# Patient Record
Sex: MALE | Race: WHITE | NOT HISPANIC OR LATINO | Employment: FULL TIME | URBAN - METROPOLITAN AREA
[De-identification: names, ages, dates, MRNs, and addresses within clinical notes are randomized per-mention and may not be internally consistent; named-entity substitution may affect disease eponyms.]

---

## 2017-01-16 ENCOUNTER — TELEPHONE (OUTPATIENT)
Dept: UROLOGY | Facility: CLINIC | Age: 67
End: 2017-01-16

## 2017-01-16 NOTE — TELEPHONE ENCOUNTER
----- Message from Lucy Clifford sent at 1/16/2017  9:34 AM CST -----  Contact: spouse maximo [phone]  Please call patient spouse maximo [phone], want to know was blood test results was received

## 2017-01-16 NOTE — TELEPHONE ENCOUNTER
I spoke with the pt's wife and advised her I contacted [facility] for the results and they are going to fax them i preparation for tomorrow's appointment.

## 2017-01-17 ENCOUNTER — OFFICE VISIT (OUTPATIENT)
Dept: UROLOGY | Facility: CLINIC | Age: 67
End: 2017-01-17
Payer: COMMERCIAL

## 2017-01-17 VITALS — HEART RATE: 70 BPM | SYSTOLIC BLOOD PRESSURE: 122 MMHG | DIASTOLIC BLOOD PRESSURE: 74 MMHG

## 2017-01-17 DIAGNOSIS — N43.0 ENCYSTED HYDROCELE: ICD-10-CM

## 2017-01-17 DIAGNOSIS — N40.0 BENIGN NON-NODULAR PROSTATIC HYPERPLASIA WITHOUT LOWER URINARY TRACT SYMPTOMS: ICD-10-CM

## 2017-01-17 DIAGNOSIS — R97.20 ELEVATED PROSTATE SPECIFIC ANTIGEN (PSA): Primary | ICD-10-CM

## 2017-01-17 LAB
BILIRUB SERPL-MCNC: NORMAL MG/DL
BLOOD URINE, POC: NORMAL
COLOR, POC UA: YELLOW
GLUCOSE UR QL STRIP: NORMAL
KETONES UR QL STRIP: NORMAL
LEUKOCYTE ESTERASE URINE, POC: NORMAL
NITRITE, POC UA: NORMAL
PH, POC UA: 5
PROTEIN, POC: NORMAL
SPECIFIC GRAVITY, POC UA: 1.01
UROBILINOGEN, POC UA: NORMAL

## 2017-01-17 PROCEDURE — 81002 URINALYSIS NONAUTO W/O SCOPE: CPT | Mod: S$GLB,,, | Performed by: UROLOGY

## 2017-01-17 PROCEDURE — 3074F SYST BP LT 130 MM HG: CPT | Mod: S$GLB,,, | Performed by: UROLOGY

## 2017-01-17 PROCEDURE — 99214 OFFICE O/P EST MOD 30 MIN: CPT | Mod: 25,S$GLB,, | Performed by: UROLOGY

## 2017-01-17 PROCEDURE — 3078F DIAST BP <80 MM HG: CPT | Mod: S$GLB,,, | Performed by: UROLOGY

## 2017-01-17 PROCEDURE — 99999 PR PBB SHADOW E&M-EST. PATIENT-LVL II: CPT | Mod: PBBFAC,,, | Performed by: UROLOGY

## 2017-01-17 PROCEDURE — 1159F MED LIST DOCD IN RCRD: CPT | Mod: S$GLB,,, | Performed by: UROLOGY

## 2017-01-17 NOTE — PROGRESS NOTES
OFFICE NOTE    CHIEF COMPLAINT:  Elevated PSA, hydrocele.    HISTORY OF PRESENT ILLNESS:  This 66-year-old male returns for routine recheck.    He has a history of elevated PSA for which he has undergone a prostate   ultrasound with biopsy on 06/30/2014 by Dr. Astudillo at the [facility], and the   pathology report had revealed no evidence of any malignancy, and he has decided   to continue to follow up with us since he lives on the [place].  His most   recent PSA had come down to 3.9 on 12/27/2016.  Voiding well without any urinary   complaints.  He also has a left hydrocele, which does not bother him, but he is   questioning it since his wife is concerned whether he may need to undergo   surgery.  The patient was reassured of the benign nature of the hydrocele and   unless it bothers him I did not recommend that he needed to undergo any surgery   for the hydrocele, and he states it has not changed in size and does not cause   him any problems.    Medical history update reveals no change in his general health since his last   visit of 07/14/2016.    PHYSICAL EXAMINATION:  ABDOMEN:  Soft, benign and nontender.  No masses.  No hernias, no organomegaly.  EXTERNAL GENITAL:  Normal phallus with adequate meatus.  Scrotal examination   reveals a normal right testis.  On the left side, there is a transilluminating   mass of approximately 8 cm  consistent with a left hydrocele.  RECTAL:  A 25 to 30 g smooth prostate.  No nodules.  Normal sphincter tone.    LABS:  UA negative with pH 5.0.    FINAL IMPRESSION:  History of elevated PSA, which appears to have stabilized;   benign prostatic hyperplasia; left hydrocele, which appears to be stable.    RECOMMENDATIONS:  Recheck in six months with followup PSA.      NERY  dd: 01/17/2017 13:34:58 (CST)  td: 01/17/2017 20:47:33 (CST)  Doc ID   #[id number]  Job ID #[id number]    CC:

## 2017-01-17 NOTE — MR AVS SNAPSHOT
[facility]  [address], [address]  [place] [address]  Phone: [phone]                  Geo Pretty   2017 9:00 AM   Office Visit    Description:  Male : 1950   Provider:  Adelso Cash MD   Department:  [facility]           Reason for Visit     Follow-up           Diagnoses this Visit        Comments    Elevated prostate specific antigen (PSA)    -  Primary            To Do List           Future Appointments        Provider Department Dept Phone    2017 9:00 AM [facility] [phone]      Goals (5 Years of Data)     None      [facility] On Call     [facility] On Call Nurse Care Line -  Assistance  Registered nurses in the [facility] On Call Center provide clinical advisement, health education, appointment booking, and other advisory services.  Call for this free service at [phone].             Medications           Message regarding Medications     Verify the changes and/or additions to your medication regime listed below are the same as discussed with your clinician today.  If any of these changes or additions are incorrect, please notify your healthcare provider.             Verify that the below list of medications is an accurate representation of the medications you are currently taking.  If none reported, the list may be blank. If incorrect, please contact your healthcare provider. Carry this list with you in case of emergency.           Current Medications     atorvastatin (LIPITOR) 10 MG tablet Take 1 tablet (10 mg total) by mouth once daily.    fenofibrate micronized (ANTARA) 90 mg Cap Take 1 capsule by mouth once daily.    irbesartan (AVAPRO) 300 MG tablet Take 1 tablet (300 mg total) by mouth once daily.    ranitidine (ZANTAC) 150 MG tablet Take 1 tablet (150 mg total) by mouth once daily.    sertraline (ZOLOFT) 100 MG tablet Take 1 tablet (100 mg total) by mouth once daily.           Clinical Reference  Information           Vital Signs - Last Recorded  Most recent update: 1/17/2017  9:32 AM by Lisa Bansal MA    BP Pulse                122/74 70          Blood Pressure          Most Recent Value    BP  122/74      Allergies as of 1/17/2017     Ibuprofen    Penicillins      Immunizations Administered on Date of Encounter - 1/17/2017     None      Orders Placed During Today's Visit      Normal Orders This Visit    POCT URINE DIPSTICK WITHOUT MICROSCOPE     Future Labs/Procedures Expected by Expires    PROSTATE SPECIFIC ANTIGEN, DIAGNOSTIC  1/17/2017 3/18/2018

## 2017-07-10 ENCOUNTER — TELEPHONE (OUTPATIENT)
Dept: UROLOGY | Facility: CLINIC | Age: 67
End: 2017-07-10

## 2017-07-10 NOTE — TELEPHONE ENCOUNTER
I spoke with the pt's wife and she stated the pt will have the total and free PSA drawn on Saturday.

## 2017-07-10 NOTE — TELEPHONE ENCOUNTER
----- Message from Payton Black sent at 7/10/2017  1:27 PM CDT -----  Contact: wife Chante   Patient's wife Sharon called requesting his lab order be sent to [facility] in [place]  He has an appointment on 7/19/17      If any questions please call  [phone] to advise.     Thanks

## 2017-07-15 ENCOUNTER — LAB VISIT (OUTPATIENT)
Dept: LAB | Facility: HOSPITAL | Age: 67
End: 2017-07-15
Attending: UROLOGY
Payer: COMMERCIAL

## 2017-07-15 DIAGNOSIS — R97.20 ELEVATED PROSTATE SPECIFIC ANTIGEN (PSA): ICD-10-CM

## 2017-07-15 LAB
PROSTATE SPECIFIC ANTIGEN, TOTAL: 5 NG/ML
PSA FREE MFR SERPL: 20.8 %
PSA FREE SERPL-MCNC: 1.04 NG/ML

## 2017-07-15 PROCEDURE — 36415 COLL VENOUS BLD VENIPUNCTURE: CPT

## 2017-07-15 PROCEDURE — 84153 ASSAY OF PSA TOTAL: CPT

## 2017-07-19 ENCOUNTER — APPOINTMENT (OUTPATIENT)
Dept: LAB | Facility: HOSPITAL | Age: 67
End: 2017-07-19
Attending: UROLOGY
Payer: COMMERCIAL

## 2017-07-19 ENCOUNTER — OFFICE VISIT (OUTPATIENT)
Dept: UROLOGY | Facility: CLINIC | Age: 67
End: 2017-07-19
Payer: COMMERCIAL

## 2017-07-19 VITALS
BODY MASS INDEX: 29.03 KG/M2 | TEMPERATURE: 99 F | WEIGHT: 202.81 LBS | HEART RATE: 77 BPM | RESPIRATION RATE: 18 BRPM | HEIGHT: 70 IN | SYSTOLIC BLOOD PRESSURE: 110 MMHG | DIASTOLIC BLOOD PRESSURE: 69 MMHG

## 2017-07-19 DIAGNOSIS — R31.29 MICROSCOPIC HEMATURIA: Primary | ICD-10-CM

## 2017-07-19 DIAGNOSIS — N40.0 BENIGN NON-NODULAR PROSTATIC HYPERPLASIA WITHOUT LOWER URINARY TRACT SYMPTOMS: ICD-10-CM

## 2017-07-19 DIAGNOSIS — N43.0 ENCYSTED HYDROCELE: ICD-10-CM

## 2017-07-19 DIAGNOSIS — R97.20 ELEVATED PROSTATE SPECIFIC ANTIGEN (PSA): ICD-10-CM

## 2017-07-19 LAB
BILIRUB SERPL-MCNC: ABNORMAL MG/DL
BLOOD URINE, POC: ABNORMAL
COLOR, POC UA: YELLOW
GLUCOSE UR QL STRIP: ABNORMAL
KETONES UR QL STRIP: ABNORMAL
LEUKOCYTE ESTERASE URINE, POC: ABNORMAL
NITRITE, POC UA: ABNORMAL
PH, POC UA: 5
PROTEIN, POC: ABNORMAL
SPECIFIC GRAVITY, POC UA: 1.01
UROBILINOGEN, POC UA: ABNORMAL

## 2017-07-19 PROCEDURE — 88112 CYTOPATH CELL ENHANCE TECH: CPT | Performed by: PATHOLOGY

## 2017-07-19 PROCEDURE — 87086 URINE CULTURE/COLONY COUNT: CPT

## 2017-07-19 PROCEDURE — 99999 PR PBB SHADOW E&M-EST. PATIENT-LVL IV: CPT | Mod: PBBFAC,,, | Performed by: UROLOGY

## 2017-07-19 PROCEDURE — 88112 CYTOPATH CELL ENHANCE TECH: CPT | Mod: 26,,, | Performed by: PATHOLOGY

## 2017-07-19 PROCEDURE — 81002 URINALYSIS NONAUTO W/O SCOPE: CPT | Mod: S$GLB,,, | Performed by: UROLOGY

## 2017-07-19 PROCEDURE — 99214 OFFICE O/P EST MOD 30 MIN: CPT | Mod: 25,S$GLB,, | Performed by: UROLOGY

## 2017-07-19 NOTE — PROGRESS NOTES
OFFICE NOTE    CHIEF COMPLAINT:  Elevated PSA, BPH, and left hydrocele.    HISTORY OF PRESENT ILLNESS:  This 66-year-old male returns for routine recheck.    He has a history of elevated PSA for which he has undergone prostate ultrasound   with biopsy on 06/30/2014 in the [facility] with Dr. Astudillo.  Pathology report   revealed no evidence of any malignancy.  His PSA had subsequently come down to   3.9 on 12/27/2016.  His most recent PSA had risen again to 5.0 on 07/15/2017.    He otherwise is voiding well with no urinary complaints.  He also is being   followed for left hydrocele that appears to be stable and not a major bother to   him, although he did mention that his wife had question whether he may need to   undergo surgery to correct it.  We did discuss repeating a scrotal ultrasound,   to which he agreed.    MEDICAL HISTORY UPDATE:  Reveals no change in his general health since his last   visit of 01/17/2017.    PHYSICAL EXAMINATION:  ABDOMEN:  Soft, benign and nontender.  No masses.  No hernias, no organomegaly.  EXTERNAL GENITAL:  Normal phallus with adequate meatus and again there is a   transilluminating 8 cm mass in the left hemiscrotum consistent with left   hydrocele.  RECTAL:  A 25 to 30 g smooth prostate.  No nodules.  Normal sphincter tone.    UA did reveal 1+ blood, moderate number of rbc's and pH 5.0.    FINAL IMPRESSION:  Elevated PSA, BPH, left hydrocele, and microscopic hematuria,   which had not been noted on prior evaluations.    RECOMMENDATIONS:  Urine for C and S and cytology, scrotal ultrasound for   followup evaluation of the hydrocele and any scrotal findings.  We did discuss   the possibility of obtaining an MRI of the prostate and we also discussed   nutritional health supplements.  After further discussion, we decided that we   will recheck him in four months with a repeat PSA.  In terms of further   hematuria workup,  will depend on the C and S and cytology and if the   hematuria  recurs and this was discussed with him and he stated he understood and   agreed.      MD/KRISHAN  dd: 07/19/2017 13:59:50 (CDT)  td: 07/19/2017 21:09:43 (CDT)  Doc ID   #[id number]  Job ID #[id number]    CC:

## 2017-07-20 LAB — BACTERIA UR CULT: NO GROWTH

## 2017-07-26 DIAGNOSIS — N43.0 ENCYSTED HYDROCELE: Primary | ICD-10-CM

## 2017-07-27 ENCOUNTER — HOSPITAL ENCOUNTER (OUTPATIENT)
Dept: RADIOLOGY | Facility: HOSPITAL | Age: 67
Discharge: HOME | End: 2017-07-27
Attending: UROLOGY
Payer: COMMERCIAL

## 2017-07-27 DIAGNOSIS — N43.0 ENCYSTED HYDROCELE: ICD-10-CM

## 2017-07-27 PROCEDURE — 76870 US EXAM SCROTUM: CPT | Mod: 26,,, | Performed by: RADIOLOGY

## 2017-07-27 PROCEDURE — 76870 US EXAM SCROTUM: CPT | Mod: TC

## 2017-09-20 ENCOUNTER — TELEPHONE (OUTPATIENT)
Dept: UROLOGY | Facility: CLINIC | Age: 67
End: 2017-09-20

## 2017-09-20 DIAGNOSIS — R31.9 HEMATURIA: Primary | ICD-10-CM

## 2017-09-20 NOTE — TELEPHONE ENCOUNTER
----- Message from Dorina Bowden sent at 9/20/2017 12:54 PM CDT -----  Contact:  call  //[phone]    Calling  To  Speak to the  Nurse about    Putting  MRI orders  In  The  Computer // please call

## 2017-09-20 NOTE — TELEPHONE ENCOUNTER
Orders transcribed from results note and signed.  Advised pt wife that MRI not needed at this time unless pt having increased sxs, which pt is not. Did advise that pt is due for UA due to hx of UA at last appt. orders ready and pt wife will have him give sample this weekend.

## 2017-11-22 ENCOUNTER — APPOINTMENT (OUTPATIENT)
Dept: LAB | Facility: HOSPITAL | Age: 67
End: 2017-11-22
Attending: UROLOGY
Payer: MEDICARE

## 2017-11-22 ENCOUNTER — OFFICE VISIT (OUTPATIENT)
Dept: UROLOGY | Facility: CLINIC | Age: 67
End: 2017-11-22
Payer: MEDICARE

## 2017-11-22 VITALS — RESPIRATION RATE: 18 BRPM | TEMPERATURE: 98 F | BODY MASS INDEX: 28.95 KG/M2 | WEIGHT: 202.19 LBS | HEIGHT: 70 IN

## 2017-11-22 DIAGNOSIS — N40.0 BENIGN PROSTATIC HYPERPLASIA WITHOUT LOWER URINARY TRACT SYMPTOMS: ICD-10-CM

## 2017-11-22 DIAGNOSIS — R31.29 MICROSCOPIC HEMATURIA: Primary | ICD-10-CM

## 2017-11-22 DIAGNOSIS — N43.0 ENCYSTED HYDROCELE: ICD-10-CM

## 2017-11-22 DIAGNOSIS — R97.20 ELEVATED PROSTATE SPECIFIC ANTIGEN (PSA): ICD-10-CM

## 2017-11-22 PROCEDURE — 99213 OFFICE O/P EST LOW 20 MIN: CPT | Mod: PBBFAC,PN | Performed by: UROLOGY

## 2017-11-22 PROCEDURE — 81002 URINALYSIS NONAUTO W/O SCOPE: CPT | Mod: PBBFAC,PN | Performed by: UROLOGY

## 2017-11-22 PROCEDURE — 88112 CYTOPATH CELL ENHANCE TECH: CPT | Mod: 26,,, | Performed by: PATHOLOGY

## 2017-11-22 PROCEDURE — 99999 PR PBB SHADOW E&M-EST. PATIENT-LVL III: CPT | Mod: PBBFAC,,, | Performed by: UROLOGY

## 2017-11-22 PROCEDURE — 99214 OFFICE O/P EST MOD 30 MIN: CPT | Mod: 25,S$PBB,, | Performed by: UROLOGY

## 2017-11-22 PROCEDURE — 88112 CYTOPATH CELL ENHANCE TECH: CPT | Performed by: PATHOLOGY

## 2017-11-22 PROCEDURE — 81000 URINALYSIS NONAUTO W/SCOPE: CPT | Mod: PBBFAC,PN | Performed by: UROLOGY

## 2017-11-22 PROCEDURE — 87086 URINE CULTURE/COLONY COUNT: CPT

## 2017-11-22 NOTE — PROGRESS NOTES
CHIEF COMPLAINT:  History of elevated PSA, BPH, left hydrocele, and microscopic   hematuria.    HISTORY OF PRESENT ILLNESS:  This 67-year-old male returns for routine recheck.    He has a history of elevated PSA for which he has undergone a prostate   ultrasound with biopsy in June 2014 at [facility] by Dr. Astudillo, but the   pathology report revealed no malignancy.  Since then the PSAs have stabilized.    His most recent PSA was 3.9 on 11/30/2017 at the same level as it was on 12/27/2016.    The patient also has a left hydrocele that is being followed conservatively, and   the patient did mention that it is not causing him any problems at this point.    He also was noted to have some microscopic hematuria at his last visit on   07/19/2017.  Urine for cultures and cytology were negative.    Urinalysis today again reveals a trace of blood, few rbc's, and pH 5.0.    PHYSICAL EXAMINATION:  As noted at his last visit on 07/19/2017, essentially   unchanged.    FINAL IMPRESSION:  History of elevated PSA that appears to have stabilized, BPH,   left hydrocele, microscopic hematuria.    RECOMMENDATIONS:  Urine for C and S and cytology.  Further hematuria workup was   discussed with the patient including imaging studies and cystoscopy.  We will   contact him with the C and S and cytology report and continue with observation   and conservative management of the left hydrocele and also we will repeat PSA in   four to six months.        NERY  dd: 11/22/2017 11:49:55 (CST)  td: 11/23/2017 06:37:17 (CST)  Doc ID   #[id number]  Job ID #[id number]    CC:

## 2017-11-24 LAB — BACTERIA UR CULT: NORMAL

## 2017-11-27 DIAGNOSIS — N02.9 RECURRENT HEMATURIA: Primary | ICD-10-CM

## 2017-12-01 ENCOUNTER — HOSPITAL ENCOUNTER (OUTPATIENT)
Dept: RADIOLOGY | Facility: HOSPITAL | Age: 67
Discharge: HOME | End: 2017-12-01
Attending: UROLOGY
Payer: MEDICARE

## 2017-12-01 DIAGNOSIS — N02.9 RECURRENT HEMATURIA: ICD-10-CM

## 2017-12-01 PROCEDURE — 74178 CT ABD&PLV WO CNTR FLWD CNTR: CPT | Mod: TC

## 2017-12-01 PROCEDURE — 25500020 PHARM REV CODE 255

## 2017-12-01 PROCEDURE — 74178 CT ABD&PLV WO CNTR FLWD CNTR: CPT | Mod: 26,,, | Performed by: RADIOLOGY

## 2017-12-01 RX ADMIN — IOHEXOL 100 ML: 350 INJECTION, SOLUTION INTRAVENOUS at 08:12

## 2017-12-08 ENCOUNTER — TELEPHONE (OUTPATIENT)
Dept: UROLOGY | Facility: CLINIC | Age: 67
End: 2017-12-08

## 2017-12-08 NOTE — TELEPHONE ENCOUNTER
----- Message from Adelso Cash MD sent at 12/3/2017 10:28 PM CST -----  CT scan - right renal calculi                   Bladder wall thickening                   Enlarged prostate                   Gallstones and fatty liver    Rec: Cysto with retrogrades           Needs to f/u with PCP concerning gallstones and fatty liver

## 2017-12-08 NOTE — TELEPHONE ENCOUNTER
Call placed to give CT results with recommendations, no answer, message left with call back number.

## 2018-04-04 ENCOUNTER — TELEPHONE (OUTPATIENT)
Dept: UROLOGY | Facility: CLINIC | Age: 68
End: 2018-04-04

## 2018-04-04 NOTE — TELEPHONE ENCOUNTER
----- Message from Zayda Archuleta sent at 4/4/2018  9:26 AM CDT -----  Contact: maximo Pretty - spouse  Contact Maria De Jesus Wei to advise when patient should return for a follow up visit at [phone].    Thank you

## 2018-04-04 NOTE — TELEPHONE ENCOUNTER
Returned call and spoke with patient wife, she inquired about the patient's next CT results and next follow up.informed that the office had reached out to him, but no return call from patient. She will speak with him and will call back to schedule a follow up appt, she verbally understood.

## 2018-05-22 ENCOUNTER — OFFICE VISIT (OUTPATIENT)
Dept: UROLOGY | Facility: CLINIC | Age: 68
End: 2018-05-22
Payer: MEDICARE

## 2018-05-22 VITALS
SYSTOLIC BLOOD PRESSURE: 117 MMHG | BODY MASS INDEX: 26.6 KG/M2 | HEART RATE: 90 BPM | HEIGHT: 71 IN | WEIGHT: 190 LBS | DIASTOLIC BLOOD PRESSURE: 70 MMHG

## 2018-05-22 DIAGNOSIS — R31.29 MICROSCOPIC HEMATURIA: ICD-10-CM

## 2018-05-22 DIAGNOSIS — N43.3 HYDROCELE, UNSPECIFIED HYDROCELE TYPE: ICD-10-CM

## 2018-05-22 DIAGNOSIS — R97.20 ELEVATED PROSTATE SPECIFIC ANTIGEN (PSA): Primary | ICD-10-CM

## 2018-05-22 DIAGNOSIS — N40.0 BENIGN PROSTATIC HYPERPLASIA WITHOUT LOWER URINARY TRACT SYMPTOMS: ICD-10-CM

## 2018-05-22 LAB
BILIRUB SERPL-MCNC: NEGATIVE MG/DL
BLOOD URINE, POC: NEGATIVE
COLOR, POC UA: YELLOW
GLUCOSE UR QL STRIP: NEGATIVE
KETONES UR QL STRIP: NEGATIVE
LEUKOCYTE ESTERASE URINE, POC: NEGATIVE
NITRITE, POC UA: NEGATIVE
PH, POC UA: 5
PROTEIN, POC: NEGATIVE
SPECIFIC GRAVITY, POC UA: 1020
UROBILINOGEN, POC UA: NEGATIVE

## 2018-05-22 PROCEDURE — 3008F BODY MASS INDEX DOCD: CPT | Mod: CPTII,S$GLB,, | Performed by: UROLOGY

## 2018-05-22 PROCEDURE — 99214 OFFICE O/P EST MOD 30 MIN: CPT | Mod: 25,S$GLB,, | Performed by: UROLOGY

## 2018-05-22 PROCEDURE — 3074F SYST BP LT 130 MM HG: CPT | Mod: CPTII,S$GLB,, | Performed by: UROLOGY

## 2018-05-22 PROCEDURE — 99999 PR PBB SHADOW E&M-EST. PATIENT-LVL III: CPT | Mod: PBBFAC,,, | Performed by: UROLOGY

## 2018-05-22 PROCEDURE — 3078F DIAST BP <80 MM HG: CPT | Mod: CPTII,S$GLB,, | Performed by: UROLOGY

## 2018-05-22 PROCEDURE — 81002 URINALYSIS NONAUTO W/O SCOPE: CPT | Mod: S$GLB,,, | Performed by: UROLOGY

## 2018-05-22 RX ORDER — SULFAMETHOXAZOLE AND TRIMETHOPRIM 800; 160 MG/1; MG/1
1 TABLET ORAL 2 TIMES DAILY
Qty: 30 TABLET | Refills: 0 | Status: SHIPPED | OUTPATIENT
Start: 2018-05-22 | End: 2018-05-30 | Stop reason: ALTCHOICE

## 2018-05-22 NOTE — PROGRESS NOTES
OFFICE NOTE    CHIEF COMPLAINT:  Elevated PSA, microscopic hematuria, BPH, and left hydrocele.    HISTORY OF PRESENT ILLNESS:  This 67-year-old male returns for routine recheck.    He has a history of elevated PSA for which he has undergone a prostate   ultrasound with biopsy in June 2014 at the [facility] [place] by Dr. Astudillo, which   revealed no evidence of any malignancy.  Since then, the PSAs had stabilized at   3.9 on 11/30/2017 but his most recent PSA had increased to 6.7 on 05/21/2018.  He   also is being followed with a history of microscopic hematuria.  The plan was   for him to undergo cystoscopy and retrograde pyelograms, but this was never done   yet, although we had recommended it to be done.  He had undergone a CT scan,   which revealed presence of right renal calculi and bladder wall thickening, and   enlarged prostate, which also, it was recommended that we would need to have   evaluated with cystoscopy.  He also is being followed for left hydrocele, which   was confirmed on ultrasound, which does not cause him any trouble or problems at   this time.    No other changes in medical history since his last visit of 11/22/2017.    PHYSICAL EXAMINATION:  ABDOMEN:  Soft, benign and nontender.  No masses.  No hernias.  No organomegaly.  EXTERNAL GENITALIA:  Again, reveals a bulging left hemiscrotal mass consistent   with the left hydrocele, essentially unchanged of approximately 8 cm in length.      RECTAL EXAMINATION:  Reveals a 25-30 g smooth prostate.  No nodules.  Normal   sphincter tone.    UA negative with pH 5.0 and more specifically, no evidence of any hematuria on   today's visit.    Last PSA was 6.7 on 05/21/2018.    FINAL IMPRESSION:  Elevated prostate-specific antigen, benign prostatic   hypertrophy, left hydrocele, and history of microscopic hematuria.    RECOMMENDATIONS:  Bactrim DS p.o. b.i.d. for two weeks and the patient will   subsequently repeat the PSA in four to six weeks with followup  appointment and   subsequently discuss the possibility of prostate ultrasound with biopsy,   possibility of cystoscopy may also need to be considered as mentioned above,   that could be done at the same time .      NERY  dd: 05/22/2018 17:48:49 (CDT)  td: 05/23/2018 08:39:00 (CDT)  Doc ID   #[id number]  Job ID #[id number]    CC:

## 2018-07-02 ENCOUNTER — TELEPHONE (OUTPATIENT)
Dept: UROLOGY | Facility: CLINIC | Age: 68
End: 2018-07-02

## 2018-07-02 NOTE — TELEPHONE ENCOUNTER
----- Message from Adelso Cash MD sent at 7/1/2018 10:08 PM CDT -----  PSA - 6.0    Rec: Keep appt

## 2018-07-06 ENCOUNTER — TELEPHONE (OUTPATIENT)
Dept: UROLOGY | Facility: CLINIC | Age: 68
End: 2018-07-06

## 2018-08-02 ENCOUNTER — TELEPHONE (OUTPATIENT)
Dept: UROLOGY | Facility: CLINIC | Age: 68
End: 2018-08-02

## 2018-08-02 NOTE — TELEPHONE ENCOUNTER
----- Message from RT Matthew sent at 8/2/2018  9:31 AM CDT -----  Contact: Chante,wife,[phone]   Chante,wife,[phone], requesting the pt's PSA test results, thanks.

## 2018-08-02 NOTE — TELEPHONE ENCOUNTER
Returned call, informed that I did reach the patient and made follow up appt for him next week, she verbally understood.

## 2018-08-09 ENCOUNTER — OFFICE VISIT (OUTPATIENT)
Dept: UROLOGY | Facility: CLINIC | Age: 68
End: 2018-08-09
Payer: MEDICARE

## 2018-08-09 VITALS
HEIGHT: 71 IN | TEMPERATURE: 99 F | SYSTOLIC BLOOD PRESSURE: 111 MMHG | BODY MASS INDEX: 26.64 KG/M2 | RESPIRATION RATE: 18 BRPM | WEIGHT: 190.25 LBS | HEART RATE: 84 BPM | DIASTOLIC BLOOD PRESSURE: 62 MMHG

## 2018-08-09 DIAGNOSIS — R97.20 ELEVATED PROSTATE SPECIFIC ANTIGEN (PSA): ICD-10-CM

## 2018-08-09 DIAGNOSIS — R31.29 MICROSCOPIC HEMATURIA: Primary | ICD-10-CM

## 2018-08-09 DIAGNOSIS — N20.0 CALCULUS OF KIDNEY: ICD-10-CM

## 2018-08-09 PROCEDURE — 3074F SYST BP LT 130 MM HG: CPT | Mod: CPTII,S$GLB,, | Performed by: UROLOGY

## 2018-08-09 PROCEDURE — 3008F BODY MASS INDEX DOCD: CPT | Mod: CPTII,S$GLB,, | Performed by: UROLOGY

## 2018-08-09 PROCEDURE — 99999 PR PBB SHADOW E&M-EST. PATIENT-LVL III: CPT | Mod: PBBFAC,,, | Performed by: UROLOGY

## 2018-08-09 PROCEDURE — 81002 URINALYSIS NONAUTO W/O SCOPE: CPT | Mod: S$GLB,,, | Performed by: UROLOGY

## 2018-08-09 PROCEDURE — 99213 OFFICE O/P EST LOW 20 MIN: CPT | Mod: 25,S$GLB,, | Performed by: UROLOGY

## 2018-08-09 PROCEDURE — 3078F DIAST BP <80 MM HG: CPT | Mod: CPTII,S$GLB,, | Performed by: UROLOGY

## 2018-08-09 NOTE — PROGRESS NOTES
OFFICE NOTE    CHIEF COMPLAINT:  Microscopic hematuria and elevated PSA.    HISTORY OF PRESENT ILLNESS:  This 67-year-old male returns for routine recheck.    He is in the process of undergoing evaluation for recurrent microscopic   hematuria and a CT scan of the abdomen and pelvis that he had done on 12/01/2017   did reveal presence of right renal calculi and bladder wall thickening and   enlarged prostate.  The plan was for him to undergo cystoscopy and retrogrades,   but the patient never did return until now to be scheduled.  The patient also is   being followed for an elevated PSA that was most recently was 6.0 on 06/29/2018.    Prior to that, it was 6.7 on 05/21/2018, but he had been down to 3.9 in   November 2017.  The patient had undergone transrectal ultrasound biopsy of the   prostate in June 2014 at the [facility] by Dr. Astudillo, but there was no evidence   of any malignancy at that time.  But, in view of the rising PSA again, it was   suggested to the patient that we consider a repeat prostate ultrasound with   biopsy.  It was suggested to him that since he needs to undergo the cystoscopy   and retrogrades we plan to do the transrectal ultrasound biopsy of the   prostate at the same time under the same anesthesia to which he was agreeable.    UA today does again reveal evidence of microscopic hematuria with trace of   blood.    His last PSA was 6.0 on 06/29/2018.    FINAL IMPRESSION:  Recurrent microscopic hematuria and elevated   prostate-specific antigen.  Renal calculi.    RECOMMENDATIONS:  Cystoscopy, retrogrades, and transrectal ultrasound biopsy of   the prostate are tentatively being scheduled under general anesthesia on   08/21/2018.      NERY  dd: 08/09/2018 16:08:17 (CDT)  td: 08/10/2018 09:34:19 (OMER)  Doc ID   #[id number]  Job ID #[id number]    CC:

## 2018-08-14 ENCOUNTER — TELEPHONE (OUTPATIENT)
Dept: UROLOGY | Facility: CLINIC | Age: 68
End: 2018-08-14

## 2018-08-14 NOTE — TELEPHONE ENCOUNTER
----- Message from Daisha Lorenzana sent at 8/14/2018  9:58 AM CDT -----  Type: Needs Medical Advice    Who Called: Patient  Symptoms (please be specific):  Umm  How long has patient had these symptoms:  Umm  Pharmacy name and phone #:  Umm  Best Call Back Number: [phone] (work)    Additional Information: Verifying time and date of procedure he states he has this week

## 2018-08-14 NOTE — TELEPHONE ENCOUNTER
Returned call, informed his procedure date is 8/21/18 and pre-op will contact this week, patient verbally understood.

## 2018-08-15 DIAGNOSIS — R97.20 ELEVATED PSA: ICD-10-CM

## 2018-08-15 DIAGNOSIS — R31.29 MICROSCOPIC HEMATURIA: Primary | ICD-10-CM

## 2018-08-15 DIAGNOSIS — R97.20 ELEVATED PROSTATE SPECIFIC ANTIGEN (PSA): ICD-10-CM

## 2018-08-15 NOTE — H&P (VIEW-ONLY)
Subjective:       Patient ID: Geo Pretty is a 67 y.o. male.    Chief Complaint:  Recurrent microscopic hematuria.  Urine cytology was negative.  CT scan revealed right renal calculi and bladder wall thickening.  PSA elevated at 6.0 that has continued to rise.    Past Medical History:   Diagnosis Date    Depression     Elevated PSA     Fatty liver     GERD (gastroesophageal reflux disease)     Gout     Hearing impaired     Hyperlipidemia     Hypertension     OA (osteoarthritis) of knee     MADELIN (obstructive sleep apnea)      Past Surgical History:   Procedure Laterality Date    HERNIA REPAIR      left inguinal    KNEE SURGERY      left x3    PROSTATE BIOPSY      TONSILLECTOMY       Family History   Problem Relation Age of Onset    Heart disease Father         CAD    Diabetes Father     Kidney disease Father     Stroke Father     Heart disease Mother     Alzheimer's disease Mother     Diabetes Brother     Diabetes Brother      Social History     Socioeconomic History    Marital status:      Spouse name: Not on file    Number of children: 3    Years of education: Not on file    Highest education level: Not on file   Social Needs    Financial resource strain: Not on file    Food insecurity - worry: Not on file    Food insecurity - inability: Not on file    Transportation needs - medical: Not on file    Transportation needs - non-medical: Not on file   Occupational History    Occupation: auto repair     Employer: [employer]   Tobacco Use    Smoking status: Never Smoker    Smokeless tobacco: Never Used   Substance and Sexual Activity    Alcohol use: No     Alcohol/week: 0.0 oz    Drug use: No    Sexual activity: Yes     Partners: Female   Other Topics Concern    Not on file   Social History Narrative    Not on file       Current Outpatient Medications   Medication Sig Dispense Refill    fenofibrate (TRICOR) 145 MG tablet TAKE 1 TABLET(145 MG) BY MOUTH EVERY DAY  90 tablet 0    irbesartan (AVAPRO) 300 MG tablet TAKE 1 TABLET(300 MG) BY MOUTH EVERY DAY 90 tablet 0    pantoprazole (PROTONIX) 40 MG tablet Take 1 tablet (40 mg total) by mouth once daily. 90 tablet 0    sertraline (ZOLOFT) 100 MG tablet Take 1 tablet (100 mg total) by mouth once daily. 90 tablet 0    simvastatin (ZOCOR) 20 MG tablet TAKE 1 TABLET(20 MG) BY MOUTH EVERY EVENING 90 tablet 0     No current facility-administered medications for this visit.      Review of patient's allergies indicates:   Allergen Reactions    Ibuprofen      Other reaction(s): Swelling    Penicillins      Other reaction(s): Anaphylaxis       Review of Systems   All other systems reviewed and are negative.      Objective:      There were no vitals filed for this visit.  Physical Exam   Cardiovascular: Normal rate.   Pulmonary/Chest: Effort normal.   Abdominal: Soft.   Genitourinary: Prostate normal and penis normal.            Assessment:       1. Microscopic hematuria    2. Elevated prostate specific antigen (PSA)    3. Elevated PSA        Plan:       Cystoscopy with retrogrades and transrectal ultrasound biopsy of the prostate

## 2018-08-16 ENCOUNTER — TELEPHONE (OUTPATIENT)
Dept: UROLOGY | Facility: CLINIC | Age: 68
End: 2018-08-16

## 2018-08-16 RX ORDER — SULFAMETHOXAZOLE AND TRIMETHOPRIM 800; 160 MG/1; MG/1
1 TABLET ORAL 2 TIMES DAILY
Qty: 20 TABLET | Refills: 0 | Status: SHIPPED | OUTPATIENT
Start: 2018-08-16 | End: 2018-08-17

## 2018-08-16 RX ORDER — CIPROFLOXACIN 500 MG/1
500 TABLET ORAL 2 TIMES DAILY
Qty: 14 TABLET | Refills: 0 | Status: SHIPPED | OUTPATIENT
Start: 2018-08-16 | End: 2018-08-17

## 2018-08-16 NOTE — TELEPHONE ENCOUNTER
I spoke with the pt and advised him that the antibiotics were sent to his pharmacy and he is going to start them on Monday evening in preparation for the procedure on 08/21/18. He verbalized understanding.

## 2018-08-17 ENCOUNTER — HOSPITAL ENCOUNTER (OUTPATIENT)
Dept: PREADMISSION TESTING | Facility: HOSPITAL | Age: 68
Discharge: HOME | End: 2018-08-17
Attending: UROLOGY
Payer: MEDICARE

## 2018-08-17 VITALS — WEIGHT: 185 LBS | HEIGHT: 70 IN | BODY MASS INDEX: 26.48 KG/M2

## 2018-08-17 DIAGNOSIS — R31.29 MICROSCOPIC HEMATURIA: ICD-10-CM

## 2018-08-17 DIAGNOSIS — R97.20 ELEVATED PROSTATE SPECIFIC ANTIGEN (PSA): ICD-10-CM

## 2018-08-17 LAB
ALBUMIN SERPL BCP-MCNC: 4.5 G/DL
ALP SERPL-CCNC: 40 U/L
ALT SERPL W/O P-5'-P-CCNC: 23 U/L
ANION GAP SERPL CALC-SCNC: 10 MMOL/L
APTT BLDCRRT: 26.9 SEC
AST SERPL-CCNC: 18 U/L
BASOPHILS # BLD AUTO: 0 K/UL
BASOPHILS NFR BLD: 0.4 %
BILIRUB SERPL-MCNC: 0.5 MG/DL
BUN SERPL-MCNC: 24 MG/DL
CALCIUM SERPL-MCNC: 9.8 MG/DL
CHLORIDE SERPL-SCNC: 101 MMOL/L
CO2 SERPL-SCNC: 27 MMOL/L
CREAT SERPL-MCNC: 1.2 MG/DL
DIFFERENTIAL METHOD: ABNORMAL
EOSINOPHIL # BLD AUTO: 0.1 K/UL
EOSINOPHIL NFR BLD: 1.8 %
ERYTHROCYTE [DISTWIDTH] IN BLOOD BY AUTOMATED COUNT: 13.4 %
EST. GFR  (AFRICAN AMERICAN): >60 ML/MIN/1.73 M^2
EST. GFR  (NON AFRICAN AMERICAN): >60 ML/MIN/1.73 M^2
GLUCOSE SERPL-MCNC: 95 MG/DL
HCT VFR BLD AUTO: 37.4 %
HGB BLD-MCNC: 12.6 G/DL
INR PPP: 1
LYMPHOCYTES # BLD AUTO: 1.3 K/UL
LYMPHOCYTES NFR BLD: 19.2 %
MCH RBC QN AUTO: 29.8 PG
MCHC RBC AUTO-ENTMCNC: 33.6 G/DL
MCV RBC AUTO: 89 FL
MONOCYTES # BLD AUTO: 0.8 K/UL
MONOCYTES NFR BLD: 11.3 %
NEUTROPHILS # BLD AUTO: 4.7 K/UL
NEUTROPHILS NFR BLD: 67.3 %
PLATELET # BLD AUTO: 279 K/UL
PMV BLD AUTO: 8.8 FL
POTASSIUM SERPL-SCNC: 4.5 MMOL/L
PROT SERPL-MCNC: 7.5 G/DL
PROTHROMBIN TIME: 10.4 SEC
RBC # BLD AUTO: 4.22 M/UL
SODIUM SERPL-SCNC: 138 MMOL/L
WBC # BLD AUTO: 7 K/UL

## 2018-08-17 PROCEDURE — 99900103 DSU ONLY-NO CHARGE-INITIAL HR (STAT)

## 2018-08-17 PROCEDURE — 85730 THROMBOPLASTIN TIME PARTIAL: CPT

## 2018-08-17 PROCEDURE — 80053 COMPREHEN METABOLIC PANEL: CPT

## 2018-08-17 PROCEDURE — 85025 COMPLETE CBC W/AUTO DIFF WBC: CPT

## 2018-08-17 PROCEDURE — 36415 COLL VENOUS BLD VENIPUNCTURE: CPT

## 2018-08-17 PROCEDURE — 85610 PROTHROMBIN TIME: CPT

## 2018-08-20 ENCOUNTER — TELEPHONE (OUTPATIENT)
Dept: UROLOGY | Facility: CLINIC | Age: 68
End: 2018-08-20

## 2018-08-20 ENCOUNTER — ANESTHESIA EVENT (OUTPATIENT)
Dept: SURGERY | Facility: HOSPITAL | Age: 68
End: 2018-08-20
Payer: MEDICARE

## 2018-08-20 NOTE — TELEPHONE ENCOUNTER
----- Message from Zayda Uriostegui sent at 8/20/2018  9:14 AM CDT -----  Type: Needs Medical Advice    Who Called:  Wife (Chante)  Best Call Back Number: [phone]  Additional Information: Patient has been prescribed two medications for pre op/needs instructions/please call back to advise.

## 2018-08-20 NOTE — TELEPHONE ENCOUNTER
I spoke with the pt and advised that he is to start taking the abx tonight and continue until complete. Understanding was verbalized.

## 2018-08-21 ENCOUNTER — HOSPITAL ENCOUNTER (OUTPATIENT)
Facility: HOSPITAL | Age: 68
Discharge: HOME | End: 2018-08-21
Attending: UROLOGY | Admitting: UROLOGY
Payer: MEDICARE

## 2018-08-21 ENCOUNTER — ANESTHESIA (OUTPATIENT)
Dept: SURGERY | Facility: HOSPITAL | Age: 68
End: 2018-08-21
Payer: MEDICARE

## 2018-08-21 DIAGNOSIS — R97.20 ELEVATED PROSTATE SPECIFIC ANTIGEN (PSA): ICD-10-CM

## 2018-08-21 DIAGNOSIS — R31.29 MICROSCOPIC HEMATURIA: ICD-10-CM

## 2018-08-21 DIAGNOSIS — R97.20 ELEVATED PSA: ICD-10-CM

## 2018-08-21 PROCEDURE — 52005 CYSTO W/URTRL CATHJ: CPT | Mod: ,,, | Performed by: UROLOGY

## 2018-08-21 PROCEDURE — 99900103 DSU ONLY-NO CHARGE-INITIAL HR (STAT): Performed by: UROLOGY

## 2018-08-21 PROCEDURE — 76872 US TRANSRECTAL: CPT | Mod: 26,,, | Performed by: UROLOGY

## 2018-08-21 PROCEDURE — D9220A PRA ANESTHESIA: Mod: CRNA,,, | Performed by: NURSE ANESTHETIST, CERTIFIED REGISTERED

## 2018-08-21 PROCEDURE — 37000009 HC ANESTHESIA EA ADD 15 MINS: Performed by: UROLOGY

## 2018-08-21 PROCEDURE — 88305 TISSUE EXAM BY PATHOLOGIST: CPT | Mod: 26,,, | Performed by: PATHOLOGY

## 2018-08-21 PROCEDURE — 25000003 PHARM REV CODE 250: Performed by: ANESTHESIOLOGY

## 2018-08-21 PROCEDURE — 71000039 HC RECOVERY, EACH ADD'L HOUR: Performed by: UROLOGY

## 2018-08-21 PROCEDURE — 36000707: Performed by: UROLOGY

## 2018-08-21 PROCEDURE — 27200651 HC AIRWAY, LMA: Performed by: NURSE ANESTHETIST, CERTIFIED REGISTERED

## 2018-08-21 PROCEDURE — 74420 UROGRAPHY RTRGR +-KUB: CPT | Mod: 26,,, | Performed by: UROLOGY

## 2018-08-21 PROCEDURE — 71000015 HC POSTOP RECOV 1ST HR: Performed by: UROLOGY

## 2018-08-21 PROCEDURE — 37000008 HC ANESTHESIA 1ST 15 MINUTES: Performed by: UROLOGY

## 2018-08-21 PROCEDURE — 88305 TISSUE EXAM BY PATHOLOGIST: CPT | Performed by: PATHOLOGY

## 2018-08-21 PROCEDURE — 99900104 DSU ONLY-NO CHARGE-EA ADD'L HR (STAT): Performed by: UROLOGY

## 2018-08-21 PROCEDURE — 76000 FLUOROSCOPY <1 HR PHYS/QHP: CPT | Mod: 26,59,, | Performed by: UROLOGY

## 2018-08-21 PROCEDURE — C1758 CATHETER, URETERAL: HCPCS | Performed by: UROLOGY

## 2018-08-21 PROCEDURE — 25500020 PHARM REV CODE 255: Performed by: UROLOGY

## 2018-08-21 PROCEDURE — 71000033 HC RECOVERY, INTIAL HOUR: Performed by: UROLOGY

## 2018-08-21 PROCEDURE — 36000706: Performed by: UROLOGY

## 2018-08-21 PROCEDURE — 25000003 PHARM REV CODE 250: Performed by: NURSE ANESTHETIST, CERTIFIED REGISTERED

## 2018-08-21 PROCEDURE — 55700 PR BIOPSY OF PROSTATE,NEEDLE/PUNCH: CPT | Mod: 51,,, | Performed by: UROLOGY

## 2018-08-21 PROCEDURE — D9220A PRA ANESTHESIA: Mod: ANES,,, | Performed by: ANESTHESIOLOGY

## 2018-08-21 PROCEDURE — C1769 GUIDE WIRE: HCPCS | Performed by: UROLOGY

## 2018-08-21 PROCEDURE — 63600175 PHARM REV CODE 636 W HCPCS: Performed by: NURSE ANESTHETIST, CERTIFIED REGISTERED

## 2018-08-21 PROCEDURE — 25000003 PHARM REV CODE 250: Performed by: UROLOGY

## 2018-08-21 PROCEDURE — 63600175 PHARM REV CODE 636 W HCPCS: Performed by: UROLOGY

## 2018-08-21 PROCEDURE — 76942 ECHO GUIDE FOR BIOPSY: CPT | Mod: 26,59,, | Performed by: UROLOGY

## 2018-08-21 RX ORDER — SODIUM CHLORIDE, SODIUM LACTATE, POTASSIUM CHLORIDE, CALCIUM CHLORIDE 600; 310; 30; 20 MG/100ML; MG/100ML; MG/100ML; MG/100ML
INJECTION, SOLUTION INTRAVENOUS CONTINUOUS
Status: DISCONTINUED | OUTPATIENT
Start: 2018-08-21 | End: 2018-08-21 | Stop reason: HOSPADM

## 2018-08-21 RX ORDER — DEXAMETHASONE SODIUM PHOSPHATE 4 MG/ML
INJECTION, SOLUTION INTRA-ARTICULAR; INTRALESIONAL; INTRAMUSCULAR; INTRAVENOUS; SOFT TISSUE
Status: DISCONTINUED | OUTPATIENT
Start: 2018-08-21 | End: 2018-08-21

## 2018-08-21 RX ORDER — PROPOFOL 10 MG/ML
VIAL (ML) INTRAVENOUS
Status: DISCONTINUED | OUTPATIENT
Start: 2018-08-21 | End: 2018-08-21

## 2018-08-21 RX ORDER — LIDOCAINE HCL/PF 100 MG/5ML
SYRINGE (ML) INTRAVENOUS
Status: DISCONTINUED | OUTPATIENT
Start: 2018-08-21 | End: 2018-08-21

## 2018-08-21 RX ORDER — SODIUM CHLORIDE 0.9 % (FLUSH) 0.9 %
3 SYRINGE (ML) INJECTION
Status: DISCONTINUED | OUTPATIENT
Start: 2018-08-21 | End: 2018-08-21 | Stop reason: HOSPADM

## 2018-08-21 RX ORDER — HYDROCODONE BITARTRATE AND ACETAMINOPHEN 5; 325 MG/1; MG/1
1 TABLET ORAL
Qty: 20 TABLET | Refills: 0 | Status: SHIPPED | OUTPATIENT
Start: 2018-08-21 | End: 2018-09-07 | Stop reason: ALTCHOICE

## 2018-08-21 RX ORDER — FENTANYL CITRATE 50 UG/ML
25 INJECTION, SOLUTION INTRAMUSCULAR; INTRAVENOUS EVERY 5 MIN PRN
Status: DISCONTINUED | OUTPATIENT
Start: 2018-08-21 | End: 2018-08-21 | Stop reason: HOSPADM

## 2018-08-21 RX ORDER — FENTANYL CITRATE 50 UG/ML
INJECTION, SOLUTION INTRAMUSCULAR; INTRAVENOUS
Status: DISCONTINUED | OUTPATIENT
Start: 2018-08-21 | End: 2018-08-21

## 2018-08-21 RX ORDER — MIDAZOLAM HYDROCHLORIDE 1 MG/ML
INJECTION, SOLUTION INTRAMUSCULAR; INTRAVENOUS
Status: DISCONTINUED | OUTPATIENT
Start: 2018-08-21 | End: 2018-08-21

## 2018-08-21 RX ORDER — ONDANSETRON 2 MG/ML
4 INJECTION INTRAMUSCULAR; INTRAVENOUS ONCE
Status: DISCONTINUED | OUTPATIENT
Start: 2018-08-21 | End: 2018-08-21 | Stop reason: HOSPADM

## 2018-08-21 RX ORDER — HYDROCODONE BITARTRATE AND ACETAMINOPHEN 5; 325 MG/1; MG/1
1 TABLET ORAL EVERY 4 HOURS PRN
Status: DISCONTINUED | OUTPATIENT
Start: 2018-08-21 | End: 2018-08-21 | Stop reason: HOSPADM

## 2018-08-21 RX ORDER — MEPERIDINE HYDROCHLORIDE 50 MG/ML
12.5 INJECTION INTRAMUSCULAR; INTRAVENOUS; SUBCUTANEOUS ONCE AS NEEDED
Status: DISCONTINUED | OUTPATIENT
Start: 2018-08-21 | End: 2018-08-21 | Stop reason: HOSPADM

## 2018-08-21 RX ORDER — ONDANSETRON 2 MG/ML
INJECTION INTRAMUSCULAR; INTRAVENOUS
Status: DISCONTINUED | OUTPATIENT
Start: 2018-08-21 | End: 2018-08-21

## 2018-08-21 RX ORDER — OXYCODONE HYDROCHLORIDE 5 MG/1
5 TABLET ORAL
Status: DISCONTINUED | OUTPATIENT
Start: 2018-08-21 | End: 2018-08-21 | Stop reason: HOSPADM

## 2018-08-21 RX ORDER — HYDROMORPHONE HYDROCHLORIDE 1 MG/ML
0.2 INJECTION, SOLUTION INTRAMUSCULAR; INTRAVENOUS; SUBCUTANEOUS EVERY 5 MIN PRN
Status: DISCONTINUED | OUTPATIENT
Start: 2018-08-21 | End: 2018-08-21 | Stop reason: HOSPADM

## 2018-08-21 RX ORDER — LIDOCAINE HYDROCHLORIDE 20 MG/ML
JELLY TOPICAL
Status: DISCONTINUED | OUTPATIENT
Start: 2018-08-21 | End: 2018-08-21 | Stop reason: HOSPADM

## 2018-08-21 RX ORDER — DIPHENHYDRAMINE HYDROCHLORIDE 50 MG/ML
25 INJECTION INTRAMUSCULAR; INTRAVENOUS EVERY 6 HOURS PRN
Status: DISCONTINUED | OUTPATIENT
Start: 2018-08-21 | End: 2018-08-21 | Stop reason: HOSPADM

## 2018-08-21 RX ORDER — EPHEDRINE SULFATE 50 MG/ML
INJECTION, SOLUTION INTRAVENOUS
Status: DISCONTINUED | OUTPATIENT
Start: 2018-08-21 | End: 2018-08-21

## 2018-08-21 RX ORDER — GENTAMICIN SULFATE 80 MG/100ML
80 INJECTION, SOLUTION INTRAVENOUS
Status: COMPLETED | OUTPATIENT
Start: 2018-08-21 | End: 2018-08-21

## 2018-08-21 RX ORDER — SODIUM CHLORIDE, SODIUM LACTATE, POTASSIUM CHLORIDE, CALCIUM CHLORIDE 600; 310; 30; 20 MG/100ML; MG/100ML; MG/100ML; MG/100ML
75 INJECTION, SOLUTION INTRAVENOUS CONTINUOUS
Status: DISCONTINUED | OUTPATIENT
Start: 2018-08-21 | End: 2018-08-21 | Stop reason: HOSPADM

## 2018-08-21 RX ORDER — LIDOCAINE HYDROCHLORIDE 10 MG/ML
1 INJECTION, SOLUTION EPIDURAL; INFILTRATION; INTRACAUDAL; PERINEURAL ONCE
Status: DISCONTINUED | OUTPATIENT
Start: 2018-08-21 | End: 2018-08-21 | Stop reason: HOSPADM

## 2018-08-21 RX ORDER — PHENAZOPYRIDINE HYDROCHLORIDE 200 MG/1
200 TABLET, FILM COATED ORAL ONCE
Status: COMPLETED | OUTPATIENT
Start: 2018-08-21 | End: 2018-08-21

## 2018-08-21 RX ORDER — PHENAZOPYRIDINE HYDROCHLORIDE 100 MG/1
200 TABLET, FILM COATED ORAL
Qty: 20 TABLET | Refills: 0 | Status: SHIPPED | OUTPATIENT
Start: 2018-08-21 | End: 2018-09-07 | Stop reason: ALTCHOICE

## 2018-08-21 RX ADMIN — EPHEDRINE SULFATE 10 MG: 50 INJECTION, SOLUTION INTRAMUSCULAR; INTRAVENOUS; SUBCUTANEOUS at 01:08

## 2018-08-21 RX ADMIN — FENTANYL CITRATE 50 MCG: 50 INJECTION, SOLUTION INTRAMUSCULAR; INTRAVENOUS at 01:08

## 2018-08-21 RX ADMIN — SODIUM CHLORIDE, SODIUM LACTATE, POTASSIUM CHLORIDE, AND CALCIUM CHLORIDE: .6; .31; .03; .02 INJECTION, SOLUTION INTRAVENOUS at 01:08

## 2018-08-21 RX ADMIN — DEXAMETHASONE SODIUM PHOSPHATE 4 MG: 4 INJECTION, SOLUTION INTRAMUSCULAR; INTRAVENOUS at 01:08

## 2018-08-21 RX ADMIN — EPHEDRINE SULFATE 15 MG: 50 INJECTION, SOLUTION INTRAMUSCULAR; INTRAVENOUS; SUBCUTANEOUS at 01:08

## 2018-08-21 RX ADMIN — GENTAMICIN SULFATE 80 MG: 80 INJECTION, SOLUTION INTRAVENOUS at 01:08

## 2018-08-21 RX ADMIN — ONDANSETRON 4 MG: 2 INJECTION, SOLUTION INTRAMUSCULAR; INTRAVENOUS at 01:08

## 2018-08-21 RX ADMIN — PROPOFOL 200 MG: 10 INJECTION, EMULSION INTRAVENOUS at 01:08

## 2018-08-21 RX ADMIN — PHENAZOPYRIDINE HYDROCHLORIDE 200 MG: 200 TABLET ORAL at 02:08

## 2018-08-21 RX ADMIN — MIDAZOLAM 2 MG: 1 INJECTION INTRAMUSCULAR; INTRAVENOUS at 12:08

## 2018-08-21 RX ADMIN — SODIUM CHLORIDE, SODIUM LACTATE, POTASSIUM CHLORIDE, AND CALCIUM CHLORIDE: .6; .31; .03; .02 INJECTION, SOLUTION INTRAVENOUS at 11:08

## 2018-08-21 RX ADMIN — LIDOCAINE HYDROCHLORIDE 100 MG: 20 INJECTION, SOLUTION INTRAVENOUS at 01:08

## 2018-08-21 NOTE — TRANSFER OF CARE
"Anesthesia Transfer of Care Note    Patient: Geo Pretty    Procedure(s) Performed: Procedure(s) (LRB):  BIOPSY, PROSTATE, TRANSRECTAL APPROACH, WITH US GUIDANCE (N/A)  CYSTOSCOPY, WITH RETROGRADE PYELOGRAM (Bilateral)    Patient location: PACU    Transport from OR: Transported from OR on 2-3 L/min O2 by NC with adequate spontaneous ventilation    Post pain: adequate analgesia    Post assessment: no apparent anesthetic complications    Post vital signs: stable    Level of consciousness: awake    Nausea/Vomiting: no nausea/vomiting    Complications: none    Transfer of care protocol was followed      Last vitals:   Visit Vitals  BP (!) 104/54   Pulse 64   Temp 36.6 °C (97.9 °F) (Skin)   Resp 20   Ht 5' 10" (1.778 m)   Wt 83.9 kg (185 lb)   SpO2 99%   BMI 26.54 kg/m²     "

## 2018-08-21 NOTE — OP NOTE
TRANSRECTAL PROSTATIC ULTRASOUND, TRANSRECTAL PROSTATE BIOPSY     DATE: 2018    SURGEON: Adelso Cash MD    ANESTHESIA: General    NAME:Geo Pretty  : 1950  Diagnosis: Elevated PSA, BPH,PROSTATE NODULE    Current PSA: 6.0    VEE: Smooth prostatic surface     PROCEDURE DESCRIPTION:  After informed consent was obtained and signed and after cystoscopy with retrogrades were completed position, with the patient in the lithotomy position the US probe was placed in the rectum.  The prostate was examined and measured the findings are as indicated.     Axial and saggital views were obtained.                        TRANSRECTAL ULTRASOUND  Measurements:   Height:  37.8 mm  Width:  55.9 mm  Length:  55.4 mm  Volume: 61.3 cm3    Seminal vesicles/Ejaculatory Ducts: Normal  Outline/Symmetry of Prostate: WNL  Central Gland/Transition Zone: BPH Cysts  Peripheral Zone: WNL Hypoechoic mid  Demarcation btw. Centra and Peripheral Zones: well demarcated  Bladder: Normal  MedianLobe: Normal    BIOPSY:  Biopsies were obtained from both lobes and total of 6 on each side including base, mid gland, apex, anterior, medial and lateral aspects.  Pressure was held for several minutes on each lobe for hemostasis.  Prophylactic antibiotics were given in the form of Gentamycin 80 mg IV.    IMPRESSION:  Enlarged/Large BPH Suspicion for adenocarcinoma of the prostate    RECOMMENDATION:  1.  Instructed to return to clinic or ED should S/S including fever, chills or the inability to urinate.  2.  Return to clinic in 7-10 days for results and follow up.  3. Continue Cipro and Bactrim til completed.

## 2018-08-21 NOTE — PLAN OF CARE
1645- Meets criteria for discharge. Discharged to home with wife. Denies nausea. Tolerating fluids. Denies pain. Steady gait. Voiding without difficulty. Discharge instructions reviewed and printed handout given. Verbalized understanding.

## 2018-08-21 NOTE — OP NOTE
DATE OF PROCEDURE:  08/21/2018.    PREOPERATIVE DIAGNOSES:  Recurrent microscopic hematuria and elevated PSA of   6.0.    POSTOPERATIVE DIAGNOSES:  Recurrent microscopic hematuria and elevated PSA of   6.0, pathology pending and also submucosal prostatic varices.    PROCEDURE PERFORMED:  Cystourethroscopy, bilateral retrograde pyelogram and   transrectal ultrasound with prostatic biopsies with ultrasonic guidance.    SURGEON:  Adelso Cash M.D.    ANESTHESIA:  General.    PROCEDURE IN DETAIL:  The patient was brought to Cystoscopy Suite and after   adequate induction of general anesthesia, he was placed in lithotomy position.    Genitalia were prepped and draped in usual manner.  Plain fluoroscopic images of   the abdomen and pelvis were obtained and no significant findings were   encountered and in fact the calculi that had been seen on prior imaging studies   were not clearly visible on today's images.  A 22-Yoruba cystoscope sheath with   a foroblique lens and video camera was inserted under direct vision into   urethra.  Examination of anterior urethra was unremarkable.  The instrument was   advanced towards the prostatic urethra where the verumontanum was encountered   and there were number of prostatic calculi embedded within the mucosa of the   lumen of the prostatic urethra.  There was also some enlargement of lateral   lobes of the prostate with increased bulging from the left side to the right   side.  The interior of the bladder was then examined.  The trigone was   symmetrically configurated.  Both orifices were slit-like and had clear efflux.    Bladder mucosa was smooth throughout.  No evidence of trabeculation or   diverticula, no cellules, no lesions, no hemorrhagic sites.  An 8-Yoruba cone   tip ureteral catheter was inserted into the right ureteral orifice and contrast   was injected.  In this fashion, a right retrograde pyelogram was obtained and   this revealed essentially normal right  ureter and pyelocalyceal system.  A   similar procedure was carried out on the left side, which also revealed   essentially normal ureter and pyelocalyceal system.  Subsequently, the bladder   was then drained, cystoscope was removed and subsequently transrectal ultrasound   biopsy of the prostate was performed, and this will be reported on a separate   documentation note to be noted.  After the transrectal ultrasound biopsy of the   prostate had been completed, the patient was then transferred to the Recovery   Room in stable condition.          /nat [id number] review        MD/ARINA  dd: 08/21/2018 14:07:06 (CDT)  td: 08/21/2018 16:55:49 (CDT)  Doc ID   #[id number]  Job ID #[id number]    CC:

## 2018-08-21 NOTE — INTERVAL H&P NOTE
The patient has been examined and the H&P has been reviewed:    I concur with the findings and no changes have occurred since H&P was written.    Anesthesia/Surgery risks, benefits and alternative options discussed and understood by patient/family.          Active Hospital Problems    Diagnosis  POA    Elevated PSA [R97.20]  Yes      Resolved Hospital Problems   No resolved problems to display.

## 2018-08-21 NOTE — ANESTHESIA POSTPROCEDURE EVALUATION
"Anesthesia Post Evaluation    Patient: Geo Pretty    Procedure(s) Performed: Procedure(s) (LRB):  BIOPSY, PROSTATE, TRANSRECTAL APPROACH, WITH US GUIDANCE (N/A)  CYSTOSCOPY, WITH RETROGRADE PYELOGRAM (Bilateral)    Final Anesthesia Type: general  Patient location during evaluation: PACU  Patient participation: Yes- Able to Participate  Level of consciousness: awake and alert and oriented  Post-procedure vital signs: reviewed and stable  Pain management: adequate  Airway patency: patent  PONV status at discharge: No PONV  Anesthetic complications: no      Cardiovascular status: blood pressure returned to baseline  Respiratory status: unassisted, spontaneous ventilation and room air  Hydration status: euvolemic  Follow-up not needed.        Visit Vitals  BP (!) 104/54   Pulse 64   Temp 36.6 °C (97.9 °F) (Skin)   Resp 20   Ht 5' 10" (1.778 m)   Wt 83.9 kg (185 lb)   SpO2 99%   BMI 26.54 kg/m²       Pain/Mali Score: Pain Assessment Performed: Yes (8/21/2018 10:52 AM)  Presence of Pain: denies (8/21/2018 10:52 AM)        "

## 2018-08-21 NOTE — ANESTHESIA PREPROCEDURE EVALUATION
08/21/2018  Geo Pretty is a 67 y.o., male.    Anesthesia Evaluation    I have reviewed the Patient Summary Reports.    I have reviewed the Nursing Notes.   I have reviewed the Medications.     Review of Systems  Social:  Non-Smoker Smoking Status: Never Smoker  Smokeless Tobacco Status: Never Used  Alcohol use: No  Drug use: No       Cardiovascular:   Hypertension hyperlipidemia    Pulmonary:   Sleep Apnea    Hepatic/GI:   GERD Liver Disease,    Musculoskeletal:   Arthritis     Psych:   Psychiatric History          Physical Exam  General:  Well nourished    Airway/Jaw/Neck:  Airway Findings: Mouth Opening: Normal Tongue: Normal  General Airway Assessment: Adult, Good  Mallampati: II  Improves to II with phonation.  TM Distance: 4-6 cm      Dental:  Dental Findings: In tact   Chest/Lungs:  Chest/Lungs Findings: Clear to auscultation, Normal Respiratory Rate     Heart/Vascular:  Heart Findings: Rate: Normal  Rhythm: Regular Rhythm  Sounds: Normal  Heart murmur: negative       Mental Status:  Mental Status Findings:  Cooperative, Alert and Oriented         Anesthesia Plan  Type of Anesthesia, risks & benefits discussed:  Anesthesia Type:  general  Patient's Preference:   Intra-op Monitoring Plan: standard ASA monitors  Intra-op Monitoring Plan Comments:   Post Op Pain Control Plan:   Post Op Pain Control Plan Comments:   Induction:   IV  Beta Blocker:  Patient is not currently on a Beta-Blocker (No further documentation required).       Informed Consent: Patient understands risks and agrees with Anesthesia plan.  Questions answered.   ASA Score: 2     Day of Surgery Review of History & Physical: I have interviewed and examined the patient. I have reviewed the patient's H&P dated:  There are no significant changes.          Ready For Surgery From Anesthesia Perspective.

## 2018-08-21 NOTE — DISCHARGE INSTRUCTIONS
Cystoscopy    Cystoscopy is a procedure that lets your doctor look directly inside your urethra and bladder. It can be used to:  · Help diagnose a problem with your urethra, bladder, or kidneys.  · Take a sample (biopsy) of bladder or urethral tissue.  · Treat certain problems (such as removing kidney stones).  · Place a stent to bypass an obstruction.  · Take special X-rays of the kidneys.  Based on the findings, your doctor may recommend other tests or treatments.  What is a cystoscope?  A cystoscope is a telescope-like instrument that contains lenses and fiberoptics (small glass wires that make bright light). The cystoscope may be straight and rigid, or flexible to bend around curves in the urethra. The doctor may look directly into the cystoscope, or project the image onto a monitor.  Getting ready  · Ask your doctor if you should stop taking any medicines before the procedure.  · Ask whether you should avoid eating or drinking anything after midnight before the procedure.  · Follow any other instructions your doctor gives you.  Tell your doctor before the exam if you:  · Take any medicines, such as aspirin or blood thinners  · Have allergies to any medicines  · Are pregnant   The procedure  Cystoscopy is done in the doctors office, surgery center, or hospital. The doctor and a nurse are present during the procedure. It takes only a few minutes, longer if a biopsy, X-ray, or treatment needs to be done.  During the procedure:  · You lie on an exam table on your back, knees bent and legs apart. You are covered with a drape.  · Your urethra and the area around it are washed. Anesthetic jelly may be applied to numb the urethra. Other pain medicine is usually not needed. In some cases, you may be offered a mild sedative to help you relax. If a more extensive procedure is to be done, such as a biopsy or kidney stone removal, general anesthesia may be needed.  · The cystoscope is inserted. A sterile fluid is put  into the bladder to expand it. You may feel pressure from this fluid.  · When the procedure is done, the cystoscope is removed.  After the procedure  If you had a sedative, general anesthesia, or spinal anesthesia, you must have someone drive you home. Once youre home:  · Drink plenty of fluids.  · You may have burning or light bleeding when you urinate--this is normal.  · Medicines may be prescribed to ease any discomfort or prevent infection. Take these as directed.  · Call your doctor if you have heavy bleeding or blood clots, burning that lasts more than a day, a fever over 100°F  (38° C), or trouble urinating.  Date Last Reviewed: 1/1/2017 © 2000-2017 [employer] [address] [place] [address], [place] [address]. All rights reserved. This information is not intended as a substitute for professional medical care. Always follow your healthcare professional's instructions.      Cystography (Retrograde Cystography)  Cystography (also called retrograde cystography) is a detailed X-ray exam of your bladder. For this procedure, your bladder is filled with an X-ray dye (contrast medium). The dye lets your bladder be seen more clearly on the X-ray images. This procedure is done by a radiologist.    Why cystography is done  A cystography can help diagnose such bladder problems as:  · Kidney stones  · Wounds or bursting (rupture) of your bladder wall  · Urinary tract infection  · Blood clots  · Tumors  Getting ready for your procedure  · If instructed, empty your bowel before the exam using a medicine (laxative) or a liquid injected into your rectum (enema).  · Empty your bladder before the exam.  Tell your provider if you:  · Have any allergies to X-ray dye  · Have a bladder infection  · Are pregnant or think you may be pregnant  Follow any other instructions you are given.  During your procedure  · You will change into a hospital gown and lie on an exam table. Your urethra will be numbed with an anesthetic  jelly. You may also be given medication to help you relax.  · A thin tube (catheter) will be put into your urethra up to your bladder. You will feel pressure. The dye will be slowly put through the catheter into your bladder. As your bladder fills with this liquid, you will feel the need to urinate. Tell the radiologist when this becomes uncomfortable.  · X-rays are taken of your full bladder. The catheter is removed, your bladder is then drained, and more X-rays are taken.  Possible risks and complications  · Infection or bruising at the place where the tube is put into your urethra  · Problems due to the dye. This includes an allergic reaction or kidney damage.  · Radiation exposure to your reproductive organs   After your procedure  · You may feel some burning when you urinate the first few times after the test. Drink plenty of water after the exam to help flush the dye out of your body.   · Your provider will discuss your test results with you. He or she will recommend more testing or treatment as needed.  When to call your healthcare provider   Call your provider right away if you have:  · Blood in your urine, after you have gone to the bathroom three times   · Signs of infection, such as chills, fever, rapid heartbeat, or fast breathing         Date Last Reviewed: 7/23/2015 © 2000-2017 [employer] [address] [place] [address], [place] [address]. All rights reserved. This information is not intended as a substitute for professional medical care. Always follow your healthcare professional's instructions.      Transrectal Ultrasound and Biopsy    A transrectal ultrasound is an imaging test. It uses sound waves to create pictures of a mans prostate gland. Your prostate gland is in front of your rectum. For this test, a special probe (transducer) is placed directly into your rectum. During the test, tissue samples (a biopsy) may also be taken. The test is done by a specially trained technologist called a  sonographer.  Getting ready for your test  · You may be asked to clear your bowel before the test. This may be done by injecting liquid into your rectum (an enema). Or it can be done by drinking a special liquid.  · You may be asked not to eat or drink anything after midnight the night before the test.  · Tell your health care provider about any medicines, herbs, or supplements you are taking. This includes any over-the-counter medicines such as aspirin or ibuprofen.  · Answer any questions your provider has about your medical history. This will help your provider tailor the test to your health needs.  During your test  · You may be asked to change into a gown. You will then lie on your side on an exam table, with your knees bent.  · The test is done with a hand-held probe. This is a short, slender nate. It has a sterile, disposable cover. It is also greased (lubricated) with some gel. It is then gently placed inside your rectum.  · You will feel pressure from the probe. If you feel pain, let your provider know.  · If a biopsy is taken, it is done using a small probe with a very tiny needle on the end. This needle enters your prostate and removes several tiny samples of tissue. These samples are then sent to a lab to be examined. Any mild pain from the biopsy is usually minor.   After your test  Before leaving, you may need to wait for a short time while the images are reviewed. In most cases, you can go back to your normal routine after the test. If you had a biopsy, you may see some blood in your urine, sperm, or stool for a day or so. This is normal. Your health care provider will let you know when your test results are ready.  In some cases, a diagnosis cant be made from the tissue sample that was taken. If this happens, your provider will talk with you about whether you may need another biopsy. Or you may need a different procedure.  When to call your health care provider  Call your provider if you  have:  · Very bloody urine or stool  · A fever lasting 24 to 48 hours  · Any other symptoms that your provider asks you to report, based on your medical condition   Date Last Reviewed: 6/19/2015 © 2000-2017 [employer]. [address] [place] [address], [place] [address]. All rights reserved. This information is not intended as a substitute for professional medical care. Always follow your healthcare professional's instructions.                General Information:    1.  Do not drink alcoholic beverages including beer for 24 hours or as long as you are on pain medication..  2.  Do not drive a motor vehicle, operate machinery or power tools, or signs legal papers for 24 hours or as long as you are on pain medication.   3.  You may experience light-headedness, dizziness, and sleepiness following surgery. Please do not stay alone. A responsible adult should be with you for this 24 hour period.  4.  Go home and rest.    5. Progress slowly to a normal diet unless instructed.  Otherwise, begin with liquids such as soft drinks, then soup and crackers working up to solid foods. Drink plenty of nonalcoholic fluids.  6.  Certain anesthetics and pain medications produce nausea and vomiting in certain       individuals. If nausea becomes a problem at home, call you doctor.    7. A nurse will be calling you sometime after surgery. Do not be alarmed. This is our way of finding out how you are doing.    8. Several times every hour while you are awake, take 2-3 deep breaths and cough. If you had stomach surgery hold a pillow or rolled towel firmly against your stomach before you cough. This will help with any pain the cough might cause.  9. Several times every hour while you are awake, pump and flex your feet 5-6 times and do foot circles. This will help prevent blood clots.    10.Call your doctor for severe pain, bleeding, fever, or signs or symptoms of infection (pain, swelling, redness, foul odor, drainage).    Post op  instructions for prevention of DVT  What is deep vein thrombosis?  Deep vein thrombosis (DVT) is the medical term for blood clots in the deep veins of the leg.  These blood clots can be dangerous.  A DVT can block a blood vessel and keep blood from getting where it needs to go.  Another problem is that the clot can travel to other parts of the body such as the lungs.  A clot that travels to the lungs is called a pulmonary embolus (PE) and can cause serious problems with breathing which can lead to death.  Am I at risk for DVT/PE?  If you are not very active, you are at risk of DVT.  Anyone confined to bed, sitting for long periods of time, recovering from surgery, etc. increases the risk of DVT.  Other risk factors are cancer diagnosis, certain medications, estrogen replacement in any form,older age, obesity, pregnancy, smoking, history of clotting disorders, and dehydration.  How will I know if I have a DVT?   Swelling in the lower leg   Pain   Warmth, redness, hardness or bulging of the vein  If you have any of these symptoms, call your doctors office right away.  Some people will not have any symptoms until the clot moves to the lungs.  What are the symptoms of a PE?   Panting, shortness of breath, or trouble breathing   Sharp, knife-like chest pain when you breathe   Coughing or coughing up blood   Rapid heartbeat  If you have any of these symptoms or get worse quickly, call 911 for emergency treatment.  How can I prevent a DVT?   Avoid long periods of inactivity and dont cross your legs--get up and walk around every hour or so.   Stay active--walking after surgery is highly encouraged.  This means you should get out of the house and walk in the neighborhood.  Going up and down stairs will not impair healing (unless advised against such activity by your doctor).     Drink plenty of noncaffeinated, nonalcoholic fluids each day to prevent dehydration.   Wear special support stockings, if they have  "been advised by your doctor.   If you travel, stop at least once an hour and walk around.   Avoid smoking (assistance with stopping is available through your healthcare provider)  Always notify your doctor if you are not able to follow the post operative instructions that are given to you at the time of discharge.  It may be necessary to prescribe one of the medications available to prevent DVT.    Discharge Instructions: After Your Surgery/Procedure  Youve just had surgery. During surgery you were given medicine called anesthesia to keep you relaxed and free of pain. After surgery you may have some pain or nausea. This is common. Here are some tips for feeling better and getting well after surgery.     Stay on schedule with your medication.   Going home  Your doctor or nurse will show you how to take care of yourself when you go home. He or she will also answer your questions. Have an adult family member or friend drive you home.      For your safety we recommend these precaution for the first 24 hours after your procedure:  · Do not drive or use heavy equipment.  · Do not make important decisions or sign legal papers.  · Do not drink alcohol.  · Have someone stay with you, if needed. He or she can watch for problems and help keep you safe.  · Your concentration, balance, coordination, and judgement may be impaired for many hours after anesthesia.  Use caution when ambulating or standing up.     · You may feel weak and "washed out" after anesthesia and surgery.      Subtle residual effects of general anesthesia or sedation with regional / local anesthesia can last more than 24 hours.  Rest for the remainder of the day or longer if your Doctor/Surgeon has advised you to do so.  Although you may feel normal within the first 24 hours, your reflexes and mental ability may be impaired without you realizing it.  You may feel dizzy, lightheaded or sleepy for 24 hours or longer.      Be sure to go to all follow-up " visits with your doctor. And rest after your surgery for as long as your doctor tells you to.  Coping with pain  If you have pain after surgery, pain medicine will help you feel better. Take it as told, before pain becomes severe. Also, ask your doctor or pharmacist about other ways to control pain. This might be with heat, ice, or relaxation. And follow any other instructions your surgeon or nurse gives you.  Tips for taking pain medicine  To get the best relief possible, remember these points:  · Pain medicines can upset your stomach. Taking them with a little food may help.  · Most pain relievers taken by mouth need at least 20 to 30 minutes to start to work.  · Taking medicine on a schedule can help you remember to take it. Try to time your medicine so that you can take it before starting an activity. This might be before you get dressed, go for a walk, or sit down for dinner.  · Constipation is a common side effect of pain medicines. Call your doctor before taking any medicines such as laxatives or stool softeners to help ease constipation. Also ask if you should skip any foods. Drinking lots of fluids and eating foods such as fruits and vegetables that are high in fiber can also help. Remember, do not take laxatives unless your surgeon has prescribed them.  · Drinking alcohol and taking pain medicine can cause dizziness and slow your breathing. It can even be deadly. Do not drink alcohol while taking pain medicine.  · Pain medicine can make you react more slowly to things. Do not drive or run machinery while taking pain medicine.  Your health care provider may tell you to take acetaminophen to help ease your pain. Ask him or her how much you are supposed to take each day. Acetaminophen or other pain relievers may interact with your prescription medicines or other over-the-counter (OTC) drugs. Some prescription medicines have acetaminophen and other ingredients. Using both prescription and OTC acetaminophen for  pain can cause you to overdose. Read the labels on your OTC medicines with care. This will help you to clearly know the list of ingredients, how much to take, and any warnings. It may also help you not take too much acetaminophen. If you have questions or do not understand the information, ask your pharmacist or health care provider to explain it to you before you take the OTC medicine.  Managing nausea  Some people have an upset stomach after surgery. This is often because of anesthesia, pain, or pain medicine, or the stress of surgery. These tips will help you handle nausea and eat healthy foods as you get better. If you were on a special food plan before surgery, ask your doctor if you should follow it while you get better. These tips may help:  · Do not push yourself to eat. Your body will tell you when to eat and how much.  · Start off with clear liquids and soup. They are easier to digest.  · Next try semi-solid foods, such as mashed potatoes, applesauce, and gelatin, as you feel ready.  · Slowly move to solid foods. Dont eat fatty, rich, or spicy foods at first.  · Do not force yourself to have 3 large meals a day. Instead eat smaller amounts more often.  · Take pain medicines with a small amount of solid food, such as crackers or toast, to avoid nausea.     Call your surgeon if  · You still have pain an hour after taking medicine. The medicine may not be strong enough.  · You feel too sleepy, dizzy, or groggy. The medicine may be too strong.  · You have side effects like nausea, vomiting, or skin changes, such as rash, itching, or hives.       If you have obstructive sleep apnea  You were given anesthesia medicine during surgery to keep you comfortable and free of pain. After surgery, you may have more apnea spells because of this medicine and other medicines you were given. The spells may last longer than usual.   At home:  · Keep using the continuous positive airway pressure (CPAP) device when you sleep.  Unless your health care provider tells you not to, use it when you sleep, day or night. CPAP is a common device used to treat obstructive sleep apnea.  · Talk with your provider before taking any pain medicine, muscle relaxants, or sedatives. Your provider will tell you about the possible dangers of taking these medicines.  © 8428-9802 [employer]. [address] [place] [address], [place] [address]. All rights reserved. This information is not intended as a substitute for professional medical care. Always follow your healthcare professional's instructions.    Using Opioids for Pain Management     Your doctor has given instructions for you to take an opioid.  This is a drug for bad pain.  It helps control pain without causing bleeding and kidney problems.  Common opioid names are morphine, hydromorphone, oxycodone, and methadone. These drugs are called narcotics.    There are several safety concerns you need to know.     · It is against the law to give or sell this drug to another person.  You must keep this medicine safely locked.    · You may have side effects from taking this medication.  These include nausea, itching, sweating, sleepiness, a change in your ability to breathe, and depression.  · Do not take alcohol or sleeping pills opioids.    · Long-term opoid use may no longer giver you relief from pain.  It can cause you stomach pain, mental anxiety, and headaches.  Long-term opoid use can potentially lead to unlawful street drug abuse and reduce your ability to stay employed.    · Your body may become opioid tolerant if you need to take more to get relief.    · You must stop taking opioids if you begin having more pain as a result of the medicine.    · Opioid withdrawal occurs when you have to stop taking the drug.  It can cause you to have nausea, vomiting, diarrhea, stomach pain, anxiety, and dilated pupils in your eyes. This condition means you are opioid dependent.    · Addiction is a drug induced  brain disease. It means there are changes in how your brain is working.  Children, teens, and young adults under 25 years old are more likely to get addicted to opioids.      · Addiction can happen with repeated opioid use.  It does not happen with short-term use of two weeks or less.       For more information, please speak with your doctor or pharmacist.

## 2018-08-21 NOTE — BRIEF OP NOTE
Operative Note     SUMMARY     Surgery Date: 8/21/2018     Surgeon(s) and Role:     * Adelso Cash MD - Primary    Pre-op Diagnosis:  Elevated prostate specific antigen (PSA) [R97.20]  Microscopic hematuria [R31.29]    Post-op Diagnosis:  Elevated prostate specific antigen (PSA) [R97.20]  Microscopic hematuria [R31.29]    Procedure(s) (LRB):  BIOPSY, PROSTATE, TRANSRECTAL APPROACH, WITH US GUIDANCE (N/A)  CYSTOSCOPY, WITH RETROGRADE PYELOGRAM (Bilateral)    Anesthesia: General    Findings/Key Components:  See Op Note      Estimated Blood Loss: * No values recorded between 8/21/2018  1:25 PM and 8/21/2018  2:09 PM *         Specimens (From admission, onward)    Start     Ordered    08/21/18 1346  Specimen to Pathology - Surgery  Once     Start Status   08/21/18 1346 Collected (08/21/18 1355)       08/21/18 1355            Discharge Note      SUMMARY     Admit Date: 8/21/2018    Attending Physician: Adelso Cash MD     Discharge Physician: Adelso Cash MD    Discharge Date: 8/21/2018     Final Diagnosis: Elevated prostate specific antigen (PSA) [R97.20]  Microscopic hematuria [R31.29]    Hospital Course: uneventful, going home same day    Disposition: Home or Self Care    Patient Instructions:   Current Discharge Medication List      START taking these medications    Details   HYDROcodone-acetaminophen (NORCO) 5-325 mg per tablet Take 1 tablet by mouth every 4 to 6 hours as needed for Pain.  Qty: 20 tablet, Refills: 0      phenazopyridine (PYRIDIUM) 100 MG tablet Take 2 tablets (200 mg total) by mouth 3 (three) times daily with meals.  Qty: 20 tablet, Refills: 0         CONTINUE these medications which have NOT CHANGED    Details   fenofibrate (TRICOR) 145 MG tablet TAKE 1 TABLET(145 MG) BY MOUTH EVERY DAY  Qty: 90 tablet, Refills: 0    Associated Diagnoses: Hypercholesteremia      irbesartan (AVAPRO) 300 MG tablet TAKE 1 TABLET(300 MG) BY MOUTH EVERY DAY  Qty: 90 tablet, Refills: 0    Associated  Diagnoses: Essential hypertension      pantoprazole (PROTONIX) 40 MG tablet Take 1 tablet (40 mg total) by mouth once daily.  Qty: 90 tablet, Refills: 0    Associated Diagnoses: Gastroesophageal reflux disease without esophagitis      sertraline (ZOLOFT) 100 MG tablet Take 1 tablet (100 mg total) by mouth once daily.  Qty: 90 tablet, Refills: 0    Comments: Need f/u  Associated Diagnoses: Mild single current episode of major depressive disorder      simvastatin (ZOCOR) 20 MG tablet TAKE 1 TABLET(20 MG) BY MOUTH EVERY EVENING  Qty: 90 tablet, Refills: 0    Associated Diagnoses: Hypercholesteremia             Discharge Procedure Orders (must include Diet, Follow-up, Activity)  Resume previous diet.  Follow up with PCP as needed.

## 2018-08-22 VITALS
BODY MASS INDEX: 26.48 KG/M2 | RESPIRATION RATE: 16 BRPM | HEIGHT: 70 IN | SYSTOLIC BLOOD PRESSURE: 127 MMHG | DIASTOLIC BLOOD PRESSURE: 68 MMHG | OXYGEN SATURATION: 96 % | WEIGHT: 185 LBS | HEART RATE: 80 BPM | TEMPERATURE: 98 F

## 2018-08-22 PROBLEM — F32.5 MAJOR DEPRESSIVE DISORDER IN FULL REMISSION: Chronic | Status: ACTIVE | Noted: 2018-08-22

## 2018-09-07 ENCOUNTER — OFFICE VISIT (OUTPATIENT)
Dept: UROLOGY | Facility: CLINIC | Age: 68
End: 2018-09-07
Payer: MEDICARE

## 2018-09-07 VITALS
BODY MASS INDEX: 27.01 KG/M2 | HEART RATE: 64 BPM | DIASTOLIC BLOOD PRESSURE: 63 MMHG | HEIGHT: 70 IN | TEMPERATURE: 98 F | SYSTOLIC BLOOD PRESSURE: 109 MMHG | RESPIRATION RATE: 18 BRPM | WEIGHT: 188.69 LBS

## 2018-09-07 DIAGNOSIS — R31.29 MICROSCOPIC HEMATURIA: ICD-10-CM

## 2018-09-07 DIAGNOSIS — C61 PROSTATE CANCER: Primary | ICD-10-CM

## 2018-09-07 PROCEDURE — 3078F DIAST BP <80 MM HG: CPT | Mod: CPTII,,, | Performed by: UROLOGY

## 2018-09-07 PROCEDURE — 3074F SYST BP LT 130 MM HG: CPT | Mod: CPTII,,, | Performed by: UROLOGY

## 2018-09-07 PROCEDURE — 1101F PT FALLS ASSESS-DOCD LE1/YR: CPT | Mod: CPTII,,, | Performed by: UROLOGY

## 2018-09-07 PROCEDURE — 99213 OFFICE O/P EST LOW 20 MIN: CPT | Mod: PBBFAC,PN | Performed by: UROLOGY

## 2018-09-07 PROCEDURE — 3008F BODY MASS INDEX DOCD: CPT | Mod: CPTII,,, | Performed by: UROLOGY

## 2018-09-07 PROCEDURE — 99214 OFFICE O/P EST MOD 30 MIN: CPT | Mod: S$PBB,,, | Performed by: UROLOGY

## 2018-09-07 PROCEDURE — 99999 PR PBB SHADOW E&M-EST. PATIENT-LVL III: CPT | Mod: PBBFAC,,, | Performed by: UROLOGY

## 2018-09-07 NOTE — PROGRESS NOTES
OFFICE NOTE    CHIEF COMPLAINT:  Adenocarcinoma of the prostate, microscopic hematuria.    HISTORY OF PRESENT ILLNESS:  This 67-year-old male returns for routine recheck.    He had undergo cystoscopy and bilateral retrograde pyelograms and transrectal   ultrasound biopsy of the prostate on 08/21/2018 for evaluation of recurrent   microscopic hematuria and an elevated PSA of 6.0.  Cystoscopic examinations   revealed some submucosal prostatic varices, otherwise, no other significant   findings, but the pathology report of the prostate biopsy revealed a Rew 6   adenocarcinoma of the prostate in one out of the 12 specimens and the positive   side was on the left lateral lobe of the prostate and there was also a   high-grade prostatic intraepithelial lesion, PIN, in the right medial area, a   biopsy site.  Prostatic volume was 61.3 cc. He recently has also seen his primary care physician Dr. Seo   and found to have elevated BUN and creatinine, and a renal ultrasound and BMP   ordered for him.    Further evaluation and management of the adenocarcinoma of the prostate was   discussed with the patient.  Treatment options including radical prostatectomy,   radiation therapy, brachytherapy, hormonal therapy, combination of above and   also possibility of watchful waiting and surveillance.  I did mention we will   need to complete the metastatic workup to include a CT scan and a bone scan and   subsequently he will make a followup appointment to discuss the treatment plan   and he was given brochure and information concerning these and also the   potential side effects, risks, and expectations, his questions were answered and   he stated he understood.    FINAL IMPRESSION:  Adenocarcinoma of the prostate, microscopic hematuria,   elevated BUN and creatinine.    RECOMMENDATIONS:  We will obtain CT scan of abdomen and pelvis without contrast.    Obtain a bone scan.  We will await the findings of the renal ultrasound and    BMP that were ordered also by Dr. Seo.  The patient will subsequently make a   followup appointment to discuss the treatment plan.      NERY  dd: 09/07/2018 14:38:40 (CDT)  td: 09/08/2018 07:22:19 (CDT)  Doc ID   #[id number]  Job ID #[id number]    CC:

## 2018-09-11 ENCOUNTER — HOSPITAL ENCOUNTER (OUTPATIENT)
Dept: RADIOLOGY | Facility: HOSPITAL | Age: 68
Discharge: HOME | End: 2018-09-11
Attending: UROLOGY
Payer: MEDICARE

## 2018-09-11 DIAGNOSIS — C61 PROSTATE CANCER: ICD-10-CM

## 2018-09-11 PROCEDURE — 78306 BONE IMAGING WHOLE BODY: CPT | Mod: 26,,, | Performed by: RADIOLOGY

## 2018-09-11 PROCEDURE — A9503 TC99M MEDRONATE: HCPCS

## 2018-09-11 PROCEDURE — 74176 CT ABD & PELVIS W/O CONTRAST: CPT | Mod: 26,,, | Performed by: RADIOLOGY

## 2018-09-11 PROCEDURE — 74176 CT ABD & PELVIS W/O CONTRAST: CPT | Mod: TC

## 2018-09-11 PROCEDURE — 25500020 PHARM REV CODE 255

## 2018-09-11 RX ADMIN — IOHEXOL 30 ML: 350 INJECTION, SOLUTION INTRAVENOUS at 09:09

## 2018-09-27 PROBLEM — C61 PROSTATIC CANCER: Status: ACTIVE | Noted: 2018-09-27

## 2018-09-28 ENCOUNTER — TELEPHONE (OUTPATIENT)
Dept: UROLOGY | Facility: CLINIC | Age: 68
End: 2018-09-28

## 2018-09-28 NOTE — TELEPHONE ENCOUNTER
The pt's wife states the pt is going to proceed with brachytherapy as his treatment for PCA. I advised her the physician that does this procedure would be Dr Lerma and he is located in [facility] She was advised the pt needs to keep his appointment on 10/09/18 so that the referral can be given. Understanding was verbalized.

## 2018-09-28 NOTE — TELEPHONE ENCOUNTER
----- Message from Zhanna Ramos sent at 9/28/2018  9:39 AM CDT -----  Contact: Wife, Chante Sharif want to no what oncologist doctor will be referring patient to please call back at [phone]

## 2018-10-09 ENCOUNTER — OFFICE VISIT (OUTPATIENT)
Dept: UROLOGY | Facility: CLINIC | Age: 68
End: 2018-10-09
Payer: MEDICARE

## 2018-10-09 ENCOUNTER — TELEPHONE (OUTPATIENT)
Dept: UROLOGY | Facility: CLINIC | Age: 68
End: 2018-10-09

## 2018-10-09 VITALS
RESPIRATION RATE: 18 BRPM | BODY MASS INDEX: 27.36 KG/M2 | DIASTOLIC BLOOD PRESSURE: 67 MMHG | WEIGHT: 191.13 LBS | SYSTOLIC BLOOD PRESSURE: 120 MMHG | HEIGHT: 70 IN | TEMPERATURE: 98 F | HEART RATE: 88 BPM

## 2018-10-09 DIAGNOSIS — C61 PROSTATE CANCER: Primary | ICD-10-CM

## 2018-10-09 DIAGNOSIS — N20.0 RENAL CALCULUS: ICD-10-CM

## 2018-10-09 DIAGNOSIS — R31.29 MICROSCOPIC HEMATURIA: ICD-10-CM

## 2018-10-09 PROCEDURE — 1101F PT FALLS ASSESS-DOCD LE1/YR: CPT | Mod: CPTII,,, | Performed by: UROLOGY

## 2018-10-09 PROCEDURE — 99999 PR PBB SHADOW E&M-EST. PATIENT-LVL III: CPT | Mod: PBBFAC,,, | Performed by: UROLOGY

## 2018-10-09 PROCEDURE — 99214 OFFICE O/P EST MOD 30 MIN: CPT | Mod: S$PBB,,, | Performed by: UROLOGY

## 2018-10-09 PROCEDURE — 3078F DIAST BP <80 MM HG: CPT | Mod: CPTII,,, | Performed by: UROLOGY

## 2018-10-09 PROCEDURE — 3074F SYST BP LT 130 MM HG: CPT | Mod: CPTII,,, | Performed by: UROLOGY

## 2018-10-09 PROCEDURE — 99213 OFFICE O/P EST LOW 20 MIN: CPT | Mod: PBBFAC,PN | Performed by: UROLOGY

## 2018-10-09 NOTE — TELEPHONE ENCOUNTER
----- Message from Maynor Hall sent at 10/9/2018  4:15 PM CDT -----  Contact: Ying/[facility]  Ying called in and stated Dr. Cash referred the attached patient & patient is scheduled for tomorrow 10/10/18 at 8:30am. Ying needs medical records & demographics faxed to her at [phone] & call back number is [phone]

## 2018-10-09 NOTE — PROGRESS NOTES
OFFICE NOTE    CHIEF COMPLAINT:  Adenocarcinoma of the prostate with microscopic hematuria.    HISTORY OF PRESENT ILLNESS:  This 68-year-old male returns for routine recheck.    He was diagnosed with adenocarcinoma of the prostate following prostate   ultrasound with biopsy on 08/21/2018 when he was found to have an elevated PSA   of 6.0.  The pathology report revealed a Ulm 6 adenocarcinoma in 1 out of   the 12 specimens, although there was also another specimen with a high-grade   intraepithelial lesion.  Prostatic volume was 61.3 mL.  He completed metastatic   workup that included a CT scan and bone scan, both of these were negative for   metastatic disease.  The treatment options were again discussed with the patient   and he states he is leaning towards undergoing brachytherapy for which I   mention we would need to refer him to Dr. Lerma who is the radiation   oncologist who specializes in performing brachytherapy for which the patient   states he has an appointment with him actually in fact the next day.  The   patient also has been evaluated for microscopic hematuria for which underwent   cystoscopy and bilateral retrograde pyelograms at the same time as the prostate   ultrasound with biopsy on  August 21, 2018 and the cystoscopy did reveal some submucosal prostatic   varices, but no other significant findings.  It must be noted on the CT scan,   there was found to be a nonobstructing 3-mm right renal calculus, otherwise no   obvious significant  findings.  The only other finding that were mentioned to   the patient include gallstones, also some fatty liver, and the patient did   mention that his primary care physician, Dr. Seo is familiar with these   findings since he is the one who ordered a CT scan.    FINAL IMPRESSION:  Adenocarcinoma of prostate, microscopic hematuria, renal   calculus.    RECOMMENDATION:  The patient will keep his appointment with Dr. Lerma to be   evaluated for  brachytherapy.  Otherwise, he will continue his routine followup   with us in terms of followup with microscopic hematuria and renal calculus for   which he will need to be reevaluated at least in six months and we will await   Dr. Lerma's decision concerning the brachytherapy.      NERY  dd: 10/09/2018 17:54:55 (CDT)  td: 10/10/2018 08:38:12 (CDT)  Doc ID   #[id number]  Job ID #[id number]    CC:

## 2018-10-10 ENCOUNTER — TELEPHONE (OUTPATIENT)
Dept: UROLOGY | Facility: CLINIC | Age: 68
End: 2018-10-10

## 2018-10-10 NOTE — TELEPHONE ENCOUNTER
----- Message from Julissa Gonzalez sent at 10/10/2018  8:20 AM CDT -----  Contact: Ying  Type: Needs Medical Advice    Who Called:  Ying with [facility] [place]  Symptoms (please be specific):    How long has patient had these symptoms:    Pharmacy name and phone #:    Best Call Back Number: [phone]  Additional Information: did received P,Requesting pathology,lab work, and xrays,fax to [phone].patient is schedule to come in today for a visit need information asap.

## 2018-10-31 PROBLEM — K63.5 COLON POLYPS: Status: ACTIVE | Noted: 2018-10-31

## 2018-12-20 PROBLEM — Z98.890 S/P COLONOSCOPY: Chronic | Status: ACTIVE | Noted: 2018-12-20

## 2019-10-17 ENCOUNTER — TELEPHONE (OUTPATIENT)
Dept: RHEUMATOLOGY | Facility: CLINIC | Age: 69
End: 2019-10-17

## 2019-10-18 ENCOUNTER — OFFICE VISIT (OUTPATIENT)
Dept: RHEUMATOLOGY | Facility: CLINIC | Age: 69
End: 2019-10-18
Payer: MEDICARE

## 2019-10-18 VITALS
DIASTOLIC BLOOD PRESSURE: 58 MMHG | WEIGHT: 207 LBS | HEIGHT: 70 IN | SYSTOLIC BLOOD PRESSURE: 100 MMHG | BODY MASS INDEX: 29.63 KG/M2 | HEART RATE: 67 BPM

## 2019-10-18 DIAGNOSIS — M1A.09X0 IDIOPATHIC CHRONIC GOUT OF MULTIPLE SITES WITHOUT TOPHUS: Primary | ICD-10-CM

## 2019-10-18 PROCEDURE — 1101F PR PT FALLS ASSESS DOC 0-1 FALLS W/OUT INJ PAST YR: ICD-10-PCS | Mod: CPTII,S$GLB,, | Performed by: INTERNAL MEDICINE

## 2019-10-18 PROCEDURE — 99999 PR PBB SHADOW E&M-EST. PATIENT-LVL III: ICD-10-PCS | Mod: PBBFAC,,, | Performed by: INTERNAL MEDICINE

## 2019-10-18 PROCEDURE — 99204 OFFICE O/P NEW MOD 45 MIN: CPT | Mod: S$GLB,,, | Performed by: INTERNAL MEDICINE

## 2019-10-18 PROCEDURE — 3078F DIAST BP <80 MM HG: CPT | Mod: CPTII,S$GLB,, | Performed by: INTERNAL MEDICINE

## 2019-10-18 PROCEDURE — 3078F PR MOST RECENT DIASTOLIC BLOOD PRESSURE < 80 MM HG: ICD-10-PCS | Mod: CPTII,S$GLB,, | Performed by: INTERNAL MEDICINE

## 2019-10-18 PROCEDURE — 99999 PR PBB SHADOW E&M-EST. PATIENT-LVL III: CPT | Mod: PBBFAC,,, | Performed by: INTERNAL MEDICINE

## 2019-10-18 PROCEDURE — 3074F SYST BP LT 130 MM HG: CPT | Mod: CPTII,S$GLB,, | Performed by: INTERNAL MEDICINE

## 2019-10-18 PROCEDURE — 99204 PR OFFICE/OUTPT VISIT, NEW, LEVL IV, 45-59 MIN: ICD-10-PCS | Mod: S$GLB,,, | Performed by: INTERNAL MEDICINE

## 2019-10-18 PROCEDURE — 1101F PT FALLS ASSESS-DOCD LE1/YR: CPT | Mod: CPTII,S$GLB,, | Performed by: INTERNAL MEDICINE

## 2019-10-18 PROCEDURE — 3074F PR MOST RECENT SYSTOLIC BLOOD PRESSURE < 130 MM HG: ICD-10-PCS | Mod: CPTII,S$GLB,, | Performed by: INTERNAL MEDICINE

## 2019-10-18 RX ORDER — ALLOPURINOL 300 MG/1
300 TABLET ORAL DAILY
Qty: 30 TABLET | Refills: 6 | Status: SHIPPED | OUTPATIENT
Start: 2019-10-18 | End: 2019-11-17

## 2019-10-18 RX ORDER — COLCHICINE 0.6 MG/1
1 CAPSULE ORAL DAILY
Qty: 30 CAPSULE | Refills: 6 | Status: SHIPPED | OUTPATIENT
Start: 2019-10-18 | End: 2019-11-17

## 2019-10-18 RX ORDER — METHYLPREDNISOLONE 4 MG/1
TABLET ORAL
Qty: 1 PACKAGE | Refills: 0 | Status: SHIPPED | OUTPATIENT
Start: 2019-10-18 | End: 2020-01-06 | Stop reason: ALTCHOICE

## 2019-10-18 ASSESSMENT — ROUTINE ASSESSMENT OF PATIENT INDEX DATA (RAPID3)
PAIN SCORE: 7.5
MDHAQ FUNCTION SCORE: 0
TOTAL RAPID3 SCORE: 2.83
AM STIFFNESS SCORE: 0, NO
PSYCHOLOGICAL DISTRESS SCORE: 0
FATIGUE SCORE: 0
PATIENT GLOBAL ASSESSMENT SCORE: 1

## 2019-10-18 NOTE — PROGRESS NOTES
Chief Complaint   Patient presents with    Disease Management     chronic gout       Patient was referred by     History of presenting illness    69 year old white male comes in with recurrent right knee effusions many many years ago  This led to 3 surgeries and finally it was diagnosed to be gout in 2000    For years he did well  He was on allopurinol daily dose not known     Over the last few years he had only few attacks  Then when allopurinol was taken off he has had more attacks  But the last two months attacks are severe   Left knee vs right knee vs ankles    Diet  -doesn't eat shell fish,doesnt drink alcohol,doesnt eat red meat  Drinks lemon/lime  Stopped drinking sodas  Sugar and salt not restricted      Last uric acid 9.1 in aug 2019    Attack :  -starts with a twitch,then severe pain,swelling   Lasts for 2 weeks  Interim period no symptoms at all  Colchicine has never been prescribed the right way and hence it doesn't help  Indomethacin helps    Now off and on allopurinol    Past history : depression,HTN,HLD,GERD,Gout,sleep apnea   Prostate cancer s/p surgery    Family history : diabetes,kidney disease,stroke,heart disease     Social history : not a smoker      Review of Systems       No skin rashes,malar rash,photosensitivity   No telangiectasias   No calcinosis   No psoriasis   No patchy alopecia   No oral and nasal ulcers   No dry eyes and dry mouth   No pleurisy or any cardiopulmonary complaints   No dysphagia,diplopia and dysphonia and muscle weakness   No n/v/d/c   No acid reflux+   No raynaud's+   No digital ulcers   No cytopenias   No renal issues   No blood clots   No fever,chills,night sweats,weight loss and loss of appetite   No pregnancy losses/pre term deliveries /pregnancy complications   No new onset headaches   No recurrent conjunctivitis or uveitis or scleritis or episcleritis   No chronic or bloody diarrhea with no u colitis or crohn's /inflammatory bowel disease   No penile and  urethral  d/c/STDs/no ulcers     Labs     Mild anemia  UTD cancer screening  Recent CMPs normal  Uric acid now 9.1  When he took allopurinol uric acid stayed between 5.7 to 6.4    Xrays  Left foot and ankle nml        Physical Exam   Constitutional: He is oriented to person, place, and time and well-developed, well-nourished, and in no distress. No distress.   HENT:   Head: Normocephalic.   Mouth/Throat: Oropharynx is clear and moist.   Eyes: Conjunctivae are normal. Pupils are equal, round, and reactive to light. Right eye exhibits no discharge. Left eye exhibits no discharge. No scleral icterus.   Neck: Normal range of motion. No thyromegaly present.   Cardiovascular: Normal rate, regular rhythm, normal heart sounds and intact distal pulses.    Pulmonary/Chest: Effort normal and breath sounds normal. No stridor.   Abdominal: Soft. Bowel sounds are normal.   Lymphadenopathy:     He has no cervical adenopathy.   Neurological: He is alert and oriented to person, place, and time.   Skin: Skin is warm. No rash noted. He is not diaphoretic.     Psychiatric: Affect and judgment normal.   Musculoskeletal: Normal range of motion.           Assessment     69 year old white male with depression,HTN,HLD,GERD,Gout,sleep apnea   Prostate cancer s/p surgery    Has had gout for years in the knees initially went undiagnosed  In 2000,rheumatology evaluation confirmed the diagnosis  Allopurinol 300 mg helped him for years    When the medication was taken off the attacks recurred,initially less frequency and now extremely frequent in the knees and ankles     Diet  -doesn't eat shell fish,doesnt drink alcohol,doesnt eat red meat  Drinks lemon/lime  Stopped drinking sodas  Sugar and salt not restricted    Last uric acid 9.1 in aug 2019    Colchicine has never been prescribed the right way and hence it doesn't help  Indomethacin helps  Now off and on allopurinol    Today left ankle tender and swollen  No tophi/? A small deposit on the  ankle xray?? Unclear     1. Idiopathic chronic gout of multiple sites without tophus            New problem     Plan    Treatment goals :     Manage the acute attack if it happens   Colchicine vs prednisone vs NSAIDs   Ice application helps     ULT : always remember that allopurinol should never be stopped   Start allopurinol 300 mg daily   If well tolerated stay on the dose,in 4 weeks rpt uric acid,LFTs,renal function and then titrate dose   Will follow CBC,CMP periodically   Risk of hypersensitivity syndrome discussed which is usually within the first 60 days,common in the setting of renal dysfunction and use of thiazides.   Cautious use of antibiotics like amoxicillin and ampicillin advised   If GI upset will split the dose of allopurinol     Colchicine 0.6 mg daily suggested : for acute attack prophylaxis   Side effects : Gi upset,bone marrow suppression,cardiac toxicity,arrythmias,myotoxicity   No statin use   No renal insufficiency   No liver disease   Not on cyclosporine,tacrolimus,clarithromycin,erythromycin,verapamil,diltiazem,ketoconazole   Need to assess CBC,CMP discussed   Will stop colchicine 3 months after the target uric acid of less than 6 for nontophaceous gout and less than 5 for tophaceous gout is achieved,and he has no flares     Dietary modifications discussed : gave a hand out of all foods to avoid : avoid organ meat,red meat,seafood,shell fish,anchovies,sardines,alcohol particularly beer,fructose containing soft drinks   Its ok to consume moderate wine,diet drinks,dairy proteins,coffee   Eat cherries   Eat purine rich vegetables     Avoid beta blockers and diuretics   Losartan preferred anit hypertensive   Fenofibrate better hyperlipidemic drug     Blood pressure monitoring   Weight loss suggested     Labs every 4 weeks until the goal uric acid is achieved and then every 6 months if asymptomatic   rtc in 3 months       Geo was seen today for disease management.    Diagnoses and all orders for  this visit:    Idiopathic chronic gout of multiple sites without tophus  -     CBC auto differential; Standing  -     Comprehensive metabolic panel; Standing  -     Uric acid; Standing    Other orders  -     methylPREDNISolone (MEDROL DOSEPACK) 4 mg tablet; use as directed  -     allopurinol (ZYLOPRIM) 300 MG tablet; Take 1 tablet (300 mg total) by mouth once daily.  -     colchicine (MITIGARE) 0.6 mg Cap; Take 1 capsule (0.6 mg total) by mouth once daily.

## 2019-10-18 NOTE — LETTER
October 18, 2019      Melain eSo MD  [address]  [facility] [address]           [facility]  [address]  [place] [address]  Phone: [phone]  Fax: [phone]          Patient: Geo Pretty   MR Number: [id number]   YOB: 1950   Date of Visit: 10/18/2019       Dear Dr. Melani Seo:    Thank you for referring Geo Pretty to me for evaluation. Attached you will find relevant portions of my assessment and plan of care.    If you have questions, please do not hesitate to call me. I look forward to following Geo Pretty along with you.    Sincerely,    Jared Red MD    Enclosure  CC:  No Recipients    If you would like to receive this communication electronically, please contact [email or web address] or [phone] to request more information on [employer] access.    For providers and/or their staff who would like to refer a patient to [facility], please contact us through our one-stop-shop provider referral line, [facility], at [phone].    If you feel you have received this communication in error or would no longer like to receive these types of communications, please e-mail [email or web address]

## 2019-12-09 DIAGNOSIS — M10.9 GOUT SYNOVITIS: ICD-10-CM

## 2019-12-09 DIAGNOSIS — M25.572 ACUTE LEFT ANKLE PAIN: ICD-10-CM

## 2019-12-09 RX ORDER — COLCHICINE 0.6 MG/1
0.6 TABLET ORAL DAILY
Qty: 90 TABLET | Refills: 2 | Status: SHIPPED | OUTPATIENT
Start: 2019-12-09 | End: 2020-03-12

## 2019-12-09 RX ORDER — ALLOPURINOL 100 MG/1
100 TABLET ORAL DAILY
Qty: 90 TABLET | Refills: 2 | Status: SHIPPED | OUTPATIENT
Start: 2019-12-09 | End: 2019-12-09

## 2019-12-09 RX ORDER — ALLOPURINOL 100 MG/1
100 TABLET ORAL DAILY
Qty: 90 TABLET | Refills: 2 | Status: SHIPPED | OUTPATIENT
Start: 2019-12-09 | End: 2019-12-12

## 2019-12-09 RX ORDER — COLCHICINE 0.6 MG/1
0.6 TABLET ORAL DAILY
Qty: 90 TABLET | Refills: 2 | Status: SHIPPED | OUTPATIENT
Start: 2019-12-09 | End: 2019-12-09

## 2019-12-12 ENCOUNTER — PATIENT MESSAGE (OUTPATIENT)
Dept: RHEUMATOLOGY | Facility: CLINIC | Age: 69
End: 2019-12-12

## 2019-12-12 DIAGNOSIS — M10.9 GOUT SYNOVITIS: ICD-10-CM

## 2019-12-12 RX ORDER — ALLOPURINOL 300 MG/1
300 TABLET ORAL DAILY
Qty: 90 TABLET | Refills: 2 | Status: SHIPPED | OUTPATIENT
Start: 2019-12-12 | End: 2020-03-12

## 2020-01-06 PROBLEM — R97.20 ELEVATED PSA: Status: RESOLVED | Noted: 2018-08-15 | Resolved: 2020-01-06

## 2020-01-29 ENCOUNTER — OFFICE VISIT (OUTPATIENT)
Dept: RHEUMATOLOGY | Facility: CLINIC | Age: 70
End: 2020-01-29
Payer: MEDICARE

## 2020-01-29 ENCOUNTER — LAB VISIT (OUTPATIENT)
Dept: LAB | Facility: HOSPITAL | Age: 70
End: 2020-01-29
Attending: INTERNAL MEDICINE
Payer: MEDICARE

## 2020-01-29 VITALS
BODY MASS INDEX: 29.51 KG/M2 | DIASTOLIC BLOOD PRESSURE: 66 MMHG | SYSTOLIC BLOOD PRESSURE: 112 MMHG | WEIGHT: 205.69 LBS | HEART RATE: 83 BPM

## 2020-01-29 DIAGNOSIS — M1A.09X0 IDIOPATHIC CHRONIC GOUT OF MULTIPLE SITES WITHOUT TOPHUS: Primary | ICD-10-CM

## 2020-01-29 DIAGNOSIS — M1A.09X0 IDIOPATHIC CHRONIC GOUT OF MULTIPLE SITES WITHOUT TOPHUS: ICD-10-CM

## 2020-01-29 DIAGNOSIS — M19.012 PRIMARY OSTEOARTHRITIS OF LEFT SHOULDER: ICD-10-CM

## 2020-01-29 LAB
ALBUMIN SERPL BCP-MCNC: 4.6 G/DL (ref 3.5–5.2)
ALP SERPL-CCNC: 48 U/L (ref 55–135)
ALT SERPL W/O P-5'-P-CCNC: 41 U/L (ref 10–44)
ANION GAP SERPL CALC-SCNC: 10 MMOL/L (ref 8–16)
AST SERPL-CCNC: 25 U/L (ref 10–40)
BASOPHILS # BLD AUTO: 0.04 K/UL (ref 0–0.2)
BASOPHILS NFR BLD: 0.6 % (ref 0–1.9)
BILIRUB SERPL-MCNC: 0.4 MG/DL (ref 0.1–1)
BUN SERPL-MCNC: 18 MG/DL (ref 8–23)
CALCIUM SERPL-MCNC: 9.6 MG/DL (ref 8.7–10.5)
CHLORIDE SERPL-SCNC: 101 MMOL/L (ref 95–110)
CO2 SERPL-SCNC: 28 MMOL/L (ref 23–29)
CREAT SERPL-MCNC: 1.1 MG/DL (ref 0.5–1.4)
DIFFERENTIAL METHOD: ABNORMAL
EOSINOPHIL # BLD AUTO: 0.2 K/UL (ref 0–0.5)
EOSINOPHIL NFR BLD: 2.4 % (ref 0–8)
ERYTHROCYTE [DISTWIDTH] IN BLOOD BY AUTOMATED COUNT: 13.2 % (ref 11.5–14.5)
EST. GFR  (AFRICAN AMERICAN): >60 ML/MIN/1.73 M^2
EST. GFR  (NON AFRICAN AMERICAN): >60 ML/MIN/1.73 M^2
GLUCOSE SERPL-MCNC: 97 MG/DL (ref 70–110)
HCT VFR BLD AUTO: 44.3 % (ref 40–54)
HGB BLD-MCNC: 14.3 G/DL (ref 14–18)
IMM GRANULOCYTES # BLD AUTO: 0.02 K/UL (ref 0–0.04)
IMM GRANULOCYTES NFR BLD AUTO: 0.3 % (ref 0–0.5)
LYMPHOCYTES # BLD AUTO: 1.2 K/UL (ref 1–4.8)
LYMPHOCYTES NFR BLD: 17.3 % (ref 18–48)
MCH RBC QN AUTO: 30.1 PG (ref 27–31)
MCHC RBC AUTO-ENTMCNC: 32.3 G/DL (ref 32–36)
MCV RBC AUTO: 93 FL (ref 82–98)
MONOCYTES # BLD AUTO: 0.8 K/UL (ref 0.3–1)
MONOCYTES NFR BLD: 11.9 % (ref 4–15)
NEUTROPHILS # BLD AUTO: 4.8 K/UL (ref 1.8–7.7)
NEUTROPHILS NFR BLD: 67.5 % (ref 38–73)
NRBC BLD-RTO: 0 /100 WBC
PLATELET # BLD AUTO: 219 K/UL (ref 150–350)
PMV BLD AUTO: 10.8 FL (ref 9.2–12.9)
POTASSIUM SERPL-SCNC: 4.3 MMOL/L (ref 3.5–5.1)
PROT SERPL-MCNC: 7.6 G/DL (ref 6–8.4)
RBC # BLD AUTO: 4.75 M/UL (ref 4.6–6.2)
SODIUM SERPL-SCNC: 139 MMOL/L (ref 136–145)
URATE SERPL-MCNC: 6.3 MG/DL (ref 3.4–7)
WBC # BLD AUTO: 7.06 K/UL (ref 3.9–12.7)

## 2020-01-29 PROCEDURE — 1126F PR PAIN SEVERITY QUANTIFIED, NO PAIN PRESENT: ICD-10-PCS | Mod: S$GLB,,, | Performed by: INTERNAL MEDICINE

## 2020-01-29 PROCEDURE — 1101F PT FALLS ASSESS-DOCD LE1/YR: CPT | Mod: CPTII,S$GLB,, | Performed by: INTERNAL MEDICINE

## 2020-01-29 PROCEDURE — 3074F SYST BP LT 130 MM HG: CPT | Mod: CPTII,S$GLB,, | Performed by: INTERNAL MEDICINE

## 2020-01-29 PROCEDURE — 99214 OFFICE O/P EST MOD 30 MIN: CPT | Mod: S$GLB,,, | Performed by: INTERNAL MEDICINE

## 2020-01-29 PROCEDURE — 99214 PR OFFICE/OUTPT VISIT, EST, LEVL IV, 30-39 MIN: ICD-10-PCS | Mod: S$GLB,,, | Performed by: INTERNAL MEDICINE

## 2020-01-29 PROCEDURE — 80053 COMPREHEN METABOLIC PANEL: CPT

## 2020-01-29 PROCEDURE — 99999 PR PBB SHADOW E&M-EST. PATIENT-LVL III: ICD-10-PCS | Mod: PBBFAC,,, | Performed by: INTERNAL MEDICINE

## 2020-01-29 PROCEDURE — 3074F PR MOST RECENT SYSTOLIC BLOOD PRESSURE < 130 MM HG: ICD-10-PCS | Mod: CPTII,S$GLB,, | Performed by: INTERNAL MEDICINE

## 2020-01-29 PROCEDURE — 3078F PR MOST RECENT DIASTOLIC BLOOD PRESSURE < 80 MM HG: ICD-10-PCS | Mod: CPTII,S$GLB,, | Performed by: INTERNAL MEDICINE

## 2020-01-29 PROCEDURE — 1126F AMNT PAIN NOTED NONE PRSNT: CPT | Mod: S$GLB,,, | Performed by: INTERNAL MEDICINE

## 2020-01-29 PROCEDURE — 84550 ASSAY OF BLOOD/URIC ACID: CPT

## 2020-01-29 PROCEDURE — 36415 COLL VENOUS BLD VENIPUNCTURE: CPT

## 2020-01-29 PROCEDURE — 1159F MED LIST DOCD IN RCRD: CPT | Mod: S$GLB,,, | Performed by: INTERNAL MEDICINE

## 2020-01-29 PROCEDURE — 3078F DIAST BP <80 MM HG: CPT | Mod: CPTII,S$GLB,, | Performed by: INTERNAL MEDICINE

## 2020-01-29 PROCEDURE — 99999 PR PBB SHADOW E&M-EST. PATIENT-LVL III: CPT | Mod: PBBFAC,,, | Performed by: INTERNAL MEDICINE

## 2020-01-29 PROCEDURE — 1101F PR PT FALLS ASSESS DOC 0-1 FALLS W/OUT INJ PAST YR: ICD-10-PCS | Mod: CPTII,S$GLB,, | Performed by: INTERNAL MEDICINE

## 2020-01-29 PROCEDURE — 85025 COMPLETE CBC W/AUTO DIFF WBC: CPT

## 2020-01-29 PROCEDURE — 1159F PR MEDICATION LIST DOCUMENTED IN MEDICAL RECORD: ICD-10-PCS | Mod: S$GLB,,, | Performed by: INTERNAL MEDICINE

## 2020-01-29 RX ORDER — DICLOFENAC SODIUM 10 MG/G
2 GEL TOPICAL 2 TIMES DAILY PRN
Qty: 100 G | Refills: 5 | Status: SHIPPED | OUTPATIENT
Start: 2020-01-29 | End: 2020-09-16

## 2020-01-29 ASSESSMENT — ROUTINE ASSESSMENT OF PATIENT INDEX DATA (RAPID3)
PSYCHOLOGICAL DISTRESS SCORE: 0
TOTAL RAPID3 SCORE: .33
AM STIFFNESS SCORE: 0, NO
MDHAQ FUNCTION SCORE: 0
PAIN SCORE: 0
PATIENT GLOBAL ASSESSMENT SCORE: 1
FATIGUE SCORE: 1

## 2020-01-29 NOTE — PROGRESS NOTES
Chief Complaint   Patient presents with    Follow-up     gout         History of presenting illness    69 year old white male presented with recurrent right knee effusions many many years ago  This led to 3 surgeries and finally it was diagnosed to be gout in 2000    For years he did well    He was on allopurinol daily dose not known     Over the last few years he had only few attacks  Then when allopurinol was taken off he has had more attacks  But the last two months attacks are severe   Left knee vs right knee vs ankles    Diet  -doesn't eat shell fish,doesnt drink alcohol,doesnt eat red meat  Drinks lemon/lime  Stopped drinking sodas  Sugar and salt not restricted     uric acid 9.1 in aug 2019    Attack :  -starts with a twitch,then severe pain,swelling   Lasts for 2 weeks  Interim period no symptoms at all  Colchicine has never been prescribed the right way and hence it doesn't help  Indomethacin helps    I saw him October 2019    Resumed daily allopurinol 300 mg and colchicine 0.6 mg daily    He has done well  No gout attacks  Uric acid dropped to 6    Past history : depression,HTN,HLD,GERD,Gout,sleep apnea   Prostate cancer s/p surgery    Family history : diabetes,kidney disease,stroke,heart disease     Social history : not a smoker      Review of Systems       No skin rashes,malar rash,photosensitivity   No telangiectasias   No calcinosis   No psoriasis   No patchy alopecia   No oral and nasal ulcers   No dry eyes and dry mouth   No pleurisy or any cardiopulmonary complaints   No dysphagia,diplopia and dysphonia and muscle weakness   No n/v/d/c   No acid reflux+   No raynaud's+   No digital ulcers   No cytopenias   No renal issues   No blood clots   No fever,chills,night sweats,weight loss and loss of appetite   No pregnancy losses/pre term deliveries /pregnancy complications   No new onset headaches   No recurrent conjunctivitis or uveitis or scleritis or episcleritis   No chronic or bloody diarrhea with  no u colitis or crohn's /inflammatory bowel disease   No penile and urethral  d/c/STDs/no ulcers     Labs     Mild anemia  UTD cancer screening  Recent CMPs normal  Uric acid off allopurinol  : goes up to 9.1  When he took allopurinol uric acid stayed between 5.7 to 6.4    Xrays  Left foot and ankle nml    There is currently no information documented on the homunculus. Go to the Rheumatology activity and complete the homunculus joint exam.    Physical Exam   Constitutional: He is oriented to person, place, and time and well-developed, well-nourished, and in no distress. No distress.   HENT:   Head: Normocephalic.   Mouth/Throat: Oropharynx is clear and moist.   Eyes: Conjunctivae are normal. Pupils are equal, round, and reactive to light. Right eye exhibits no discharge. Left eye exhibits no discharge. No scleral icterus.   Neck: Normal range of motion. No thyromegaly present.   Cardiovascular: Normal rate, regular rhythm, normal heart sounds and intact distal pulses.    Pulmonary/Chest: Effort normal and breath sounds normal. No stridor.   Abdominal: Soft. Bowel sounds are normal.   Lymphadenopathy:     He has no cervical adenopathy.   Neurological: He is alert and oriented to person, place, and time.   Skin: Skin is warm. No rash noted. He is not diaphoretic.     Psychiatric: Affect and judgment normal.   Musculoskeletal: Normal range of motion.           Assessment     69 year old white male with depression,HTN,HLD,GERD,Gout,sleep apnea   Prostate cancer s/p surgery    Has had gout for years in the knees initially went undiagnosed    In 2000,rheumatology evaluation confirmed the diagnosis  Allopurinol 300 mg helped him for years  When the medication was taken off the attacks recurred,initially less frequency and then extremely frequent in the knees and ankles     Came with uric acid of 9.1    Initial visit : left ankle tender and swollen  No tophi/? A small deposit on the ankle xray?? Unclear     Well controlled  gout with normal uric acid of 6    On allopurinol 300 mg and colchicine 0.6 mg     1. Idiopathic chronic gout of multiple sites without tophus    2. Primary osteoarthritis of left shoulder            F/u problem    Plan    Stop the colchicine    Continue allopurinol 300 mg daily    Dietary modifications discussed : gave a hand out of all foods to avoid : avoid organ meat,red meat,seafood,shell fish,anchovies,sardines,alcohol particularly beer,fructose containing soft drinks   Its ok to consume moderate wine,diet drinks,dairy proteins,coffee   Eat cherries   Eat purine rich vegetables     Avoid beta blockers and diuretics   Losartan preferred anit hypertensive   Fenofibrate better hyperlipidemic drug     Blood pressure monitoring   Weight loss suggested     Labs today and every 6 months       Geo was seen today for follow-up.    Diagnoses and all orders for this visit:    Idiopathic chronic gout of multiple sites without tophus  -     CBC auto differential; Future  -     Comprehensive metabolic panel; Future  -     Uric acid; Future  -     CBC auto differential; Future  -     Comprehensive metabolic panel; Future  -     Uric acid; Future    Primary osteoarthritis of left shoulder  -     diclofenac sodium (VOLTAREN) 1 % Gel; Apply 2 g topically 2 (two) times daily as needed.

## 2020-05-06 ENCOUNTER — OFFICE VISIT (OUTPATIENT)
Dept: OTOLARYNGOLOGY | Facility: CLINIC | Age: 70
End: 2020-05-06
Payer: MEDICARE

## 2020-05-06 ENCOUNTER — CLINICAL SUPPORT (OUTPATIENT)
Dept: AUDIOLOGY | Facility: CLINIC | Age: 70
End: 2020-05-06
Payer: MEDICARE

## 2020-05-06 VITALS — SYSTOLIC BLOOD PRESSURE: 116 MMHG | HEART RATE: 71 BPM | DIASTOLIC BLOOD PRESSURE: 73 MMHG

## 2020-05-06 DIAGNOSIS — H81.12 BPPV (BENIGN PAROXYSMAL POSITIONAL VERTIGO), LEFT: Primary | ICD-10-CM

## 2020-05-06 DIAGNOSIS — H90.3 SENSORINEURAL HEARING LOSS (SNHL) OF BOTH EARS: Primary | ICD-10-CM

## 2020-05-06 DIAGNOSIS — H91.8X3 ASYMMETRICAL HEARING LOSS, UNSPECIFIED LATERALITY: ICD-10-CM

## 2020-05-06 DIAGNOSIS — R09.81 NASAL CONGESTION: ICD-10-CM

## 2020-05-06 DIAGNOSIS — H90.3 ASYMMETRICAL SENSORINEURAL HEARING LOSS: ICD-10-CM

## 2020-05-06 DIAGNOSIS — H93.12 LEFT-SIDED TINNITUS: ICD-10-CM

## 2020-05-06 PROCEDURE — 95992 CANALITH REPOSITIONING PROC: CPT | Mod: S$GLB,,, | Performed by: OTOLARYNGOLOGY

## 2020-05-06 PROCEDURE — 3074F PR MOST RECENT SYSTOLIC BLOOD PRESSURE < 130 MM HG: ICD-10-PCS | Mod: CPTII,S$GLB,, | Performed by: OTOLARYNGOLOGY

## 2020-05-06 PROCEDURE — 1101F PT FALLS ASSESS-DOCD LE1/YR: CPT | Mod: CPTII,S$GLB,, | Performed by: OTOLARYNGOLOGY

## 2020-05-06 PROCEDURE — 1126F AMNT PAIN NOTED NONE PRSNT: CPT | Mod: S$GLB,,, | Performed by: OTOLARYNGOLOGY

## 2020-05-06 PROCEDURE — 92557 PR COMPREHENSIVE HEARING TEST: ICD-10-PCS | Mod: S$GLB,,, | Performed by: AUDIOLOGIST

## 2020-05-06 PROCEDURE — 99204 OFFICE O/P NEW MOD 45 MIN: CPT | Mod: 25,S$GLB,, | Performed by: OTOLARYNGOLOGY

## 2020-05-06 PROCEDURE — 92567 PR TYMPA2METRY: ICD-10-PCS | Mod: S$GLB,,, | Performed by: AUDIOLOGIST

## 2020-05-06 PROCEDURE — 3074F SYST BP LT 130 MM HG: CPT | Mod: CPTII,S$GLB,, | Performed by: OTOLARYNGOLOGY

## 2020-05-06 PROCEDURE — 1101F PR PT FALLS ASSESS DOC 0-1 FALLS W/OUT INJ PAST YR: ICD-10-PCS | Mod: CPTII,S$GLB,, | Performed by: OTOLARYNGOLOGY

## 2020-05-06 PROCEDURE — 3078F DIAST BP <80 MM HG: CPT | Mod: CPTII,S$GLB,, | Performed by: OTOLARYNGOLOGY

## 2020-05-06 PROCEDURE — 95992 PR CANALITH REPOSITIONING PROCEDURE, PER DAY: ICD-10-PCS | Mod: S$GLB,,, | Performed by: OTOLARYNGOLOGY

## 2020-05-06 PROCEDURE — 3078F PR MOST RECENT DIASTOLIC BLOOD PRESSURE < 80 MM HG: ICD-10-PCS | Mod: CPTII,S$GLB,, | Performed by: OTOLARYNGOLOGY

## 2020-05-06 PROCEDURE — 99999 PR PBB SHADOW E&M-EST. PATIENT-LVL I: CPT | Mod: PBBFAC,,,

## 2020-05-06 PROCEDURE — 1159F MED LIST DOCD IN RCRD: CPT | Mod: S$GLB,,, | Performed by: OTOLARYNGOLOGY

## 2020-05-06 PROCEDURE — 99999 PR PBB SHADOW E&M-EST. PATIENT-LVL III: ICD-10-PCS | Mod: PBBFAC,,, | Performed by: OTOLARYNGOLOGY

## 2020-05-06 PROCEDURE — 1126F PR PAIN SEVERITY QUANTIFIED, NO PAIN PRESENT: ICD-10-PCS | Mod: S$GLB,,, | Performed by: OTOLARYNGOLOGY

## 2020-05-06 PROCEDURE — 1159F PR MEDICATION LIST DOCUMENTED IN MEDICAL RECORD: ICD-10-PCS | Mod: S$GLB,,, | Performed by: OTOLARYNGOLOGY

## 2020-05-06 PROCEDURE — 92567 TYMPANOMETRY: CPT | Mod: S$GLB,,, | Performed by: AUDIOLOGIST

## 2020-05-06 PROCEDURE — 92557 COMPREHENSIVE HEARING TEST: CPT | Mod: S$GLB,,, | Performed by: AUDIOLOGIST

## 2020-05-06 PROCEDURE — 99999 PR PBB SHADOW E&M-EST. PATIENT-LVL I: ICD-10-PCS | Mod: PBBFAC,,,

## 2020-05-06 PROCEDURE — 99999 PR PBB SHADOW E&M-EST. PATIENT-LVL III: CPT | Mod: PBBFAC,,, | Performed by: OTOLARYNGOLOGY

## 2020-05-06 PROCEDURE — 99204 PR OFFICE/OUTPT VISIT, NEW, LEVL IV, 45-59 MIN: ICD-10-PCS | Mod: 25,S$GLB,, | Performed by: OTOLARYNGOLOGY

## 2020-05-06 RX ORDER — AMOXICILLIN AND CLAVULANATE POTASSIUM 500; 125 MG/1; MG/1
1 TABLET, FILM COATED ORAL 2 TIMES DAILY
Qty: 10 TABLET | Refills: 0 | Status: CANCELLED | OUTPATIENT
Start: 2020-05-06

## 2020-05-06 NOTE — PATIENT INSTRUCTIONS
By exam, patient has signs of Benign Paroxysmal Positional Vertigo (BPPV). A canalith repositioning procedure (CRP) was performed for the left ear (Epley). Patient knows to try to remain upright for 48 hrs. If symptoms persist or recur he can try the CRP maneuver at home (a handout was given). If his symptoms persist past a couple weeks and are not improving, he should return for re-evaluation.  See info regarding BPPV attached.    We also discussed his long history of hearing loss and the benefit of using a hearing aid for the right ear to maintain his speech discrimination.    He knows to call the clinic at [phone] for any concerns.      Benign Paroxysmal Positional Vertigo     Your health care provider may move your head in certain ways to treat your BPPV.     Benign paroxysmal positional vertigo (BPPV) is a problem with the inner ear. The inner ear contains the vestibular system. This system is what helps you keep your balance. BPPV causes a feeling of spinning. It is a common problem of the vestibular system.  Understanding the vestibular system  The vestibular system of the ear is made up of very tiny parts. They include the utricle, saccule, and semicircular canals. The utricle is a tiny organ that contains calcium crystals. In some people, the crystals can move into the semicircular canals. When this happens, the system no longer works as it should. This causes BPPV. Benign means it is not life-threatening. Paroxysmal means it happens suddenly. Positional means that it happens when you move your head. Vertigo is a feeling of spinning.  What causes BPPV?  Causes include injury to your head or neck. Other problems with the vestibular system may cause BPPV. In many people, the cause of BPPV is not known.  Symptoms of BPPV  You many have repeated feelings of spinning (vertigo). The vertigo usually lasts less than 1 minute. Some movements, suchas rolling over in bed, can bring on vertigo.  Diagnosing  BPPV  Your primary health care provider may diagnose and treat your BPPV. Or you may see an ear, nose, and throat doctor (otolaryngologist). In some cases, you may see a nervous system doctor (neurologist).  The health care provider will ask about your symptoms and your medical history. He or she will examine you. You may have hearing and balance tests. As part of the exam, your health care provider may have you move your head and body in certain ways. If you have BPPV, the movements can bring on vertigo. Your provider will also look for abnormal movements of your eyes. You may have other tests to check your vestibular or nervous systems.  Treatment for BPPV  Your health care provider may try to move the calcium crystals. This is done by having you move your head and neck in certain ways. This treatment is safe and often works well. You may also be told to do these movements at home. You may still have vertigo for a few weeks. Your health care provider will recheck your symptoms, usually in about a month. Special physical therapy may also be part of treatment. In rare cases surgery may be needed for BPPV that does not go away.     When to call the health care provider  Call your health care provider right away if you have any of these:  · Symptoms that do not go away with treatment  · Symptoms that get worse  · New symptoms   Date Last Reviewed: 3/19/2015  © 1319-1372 [employer]. [address] [place] [address], [place] [address]. All rights reserved. This information is not intended as a substitute for professional medical care. Always follow your healthcare professional's instructions.

## 2020-05-06 NOTE — PROGRESS NOTES
Geo Pretty was seen in the clinic today for an audiological evaluation.  Mr. Pretty reported that he has been experiencing dizziness for a couple of weeks.  He also reported bilateral hearing loss, left worse than right.  Mr. Pretty uses a hearing aid for the right ear.    Audiological testing revealed a mild to severe sensorineural hearing loss for the right ear and profound rising to severe sloping to profound sensorineural hearing loss for the left ear.  A speech reception threshold was obtained at 45 dBHL for the right ear.  Speech discrimination was 72% for the right ear.  Speech testing was attempted for the left ear but results could not be obtained due to the severity of hearing loss.      Tympanometry testing was attempted for the right ear but results could not be obtained due to the inability to maintain a hermetic seal and revealed a Type A tympanogram for the left ear.      Recommendations:  1. Otologic evaluation  2. Annual audiological evaluation  3. Hearing protection when in noise   4. Continue use of amplification for the right ear

## 2020-05-06 NOTE — PROGRESS NOTES
70 y/o male with about 3-4 weeks of dizziness, referred by Dr. Seo.  Complains of a spinning sensation when he rotates from side to side in bed. He is not sure which side is worse. He feels spinning that only lasts seconds to minutes as long as he looks upward. He notices slight movement if he bends over or when he sits up.  Has had left tinnitus for long time - no recent increase. No N/V. No recent head trauma or concussions. 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  Some mild nasal congestion for a few weeks. No discolored mucus. No fever. No headache. No cough. No facial pain. No ear pain currently. No prior history of vertigo or dizziness like this.  Took Zithromax. No significant change in symptoms.      PMHx, PSHx, Meds, Allergies, SocHx, FamHx reviewed in EPIC    Review of Systems   Constitutional: Positive for malaise/fatigue. Negative for chills, fever and weight loss.   HENT: Positive for congestion, ear pain, hearing loss and tinnitus. Negative for ear discharge, nosebleeds, sinus pain and sore throat.    Eyes: Negative for blurred vision, double vision and discharge.   Respiratory: Negative for cough, shortness of breath and wheezing.    Cardiovascular: Negative for chest pain, palpitations and leg swelling.   Gastrointestinal: Negative for abdominal pain, constipation, diarrhea, heartburn and vomiting.   Genitourinary: Negative for frequency and urgency.   Musculoskeletal: Negative for back pain, falls, joint pain, myalgias and neck pain.   Skin: Negative for rash.   Neurological: Positive for dizziness. Negative for seizures and headaches.   Endo/Heme/Allergies: Does not bruise/bleed easily.   Psychiatric/Behavioral: Negative for depression. The patient is not nervous/anxious.    Answers for HPI/ROS submitted by the patient on 5/5/2020   sinus pressure : Yes  Snoring?: Yes  Joint pain? : Yes      PE: /73    Pulse 71   Gen: male, WN, WD, NAD, cooperative and alert, good historian  Eyes: no spontaneous nystagmus, PERRL  Ears: wearing right BTE hearing aid, no cerumen with translucent TMs bilaterally, normal bony landmarks  Nose: septum midline anteriorly then to left, dry mucus right anterior septum without ulcer or lesion , turbinates WNL, clear drainage, no visible polyps  OC/OP: tongue midline, remaining teeth in good repair, MMM, tonsil fossa well healed, some clear PND  Neck: no LAD or masses, no bruits  Face: no TTP over sinuses  Chest: equal chest excursion, no retrations  Skin: no visible concerning lesions of face, dry and intact  Lymph: no neck LAD  Neuro: CN II-VII, IX - XII intact, finger to nose testing brisk and intact, EOM intact, Romberg negative, no visible spontaneous nystagmus, no ataxia, [place] Hallpike positive to left    Audio from today      Above audio from today reviewed with patient -  Patient with long history of asymmetrical SNHL L (profound) compared to R (mild sloping down to moderate severe and back up to moderate). SD decent at 72% in right ear.    Procedure - Epley Maneuver performed with patient - patient tolerated well. Instructions provided.    Impression:   1. BPPV (benign paroxysmal positional vertigo), left     2. Asymmetrical hearing loss, unspecified laterality     3. Left-sided tinnitus     4. Nasal congestion      mild       Discussion and Plan:       By exam, patient has signs of Benign Paroxysmal Positional Vertigo (BPPV). Etiology discussed with patient.  A canalith repositioning procedure (CRP) was performed for the left ear (Epley). Patient knows to try to remain upright for 48 hrs. If symptoms persist or recur he can try the CRP maneuver at home (a handout was given). If his symptoms persist past a couple weeks and are not improving, he should return for re-evaluation.  BPPV info included in AVS.    We also discussed his long history of hearing loss and the benefit of using a  hearing aid for the right ear to maintain his speech discrimination.    He knows to call the clinic at [phone] for any concerns.

## 2020-05-19 PROBLEM — K63.5 COLON POLYPS: Status: RESOLVED | Noted: 2018-10-31 | Resolved: 2020-05-19

## 2020-07-22 ENCOUNTER — OFFICE VISIT (OUTPATIENT)
Dept: RHEUMATOLOGY | Facility: CLINIC | Age: 70
End: 2020-07-22
Payer: MEDICARE

## 2020-07-22 DIAGNOSIS — M10.9 GOUT SYNOVITIS: ICD-10-CM

## 2020-07-22 DIAGNOSIS — M1A.09X0 IDIOPATHIC CHRONIC GOUT OF MULTIPLE SITES WITHOUT TOPHUS: Primary | ICD-10-CM

## 2020-07-22 PROCEDURE — 99214 OFFICE O/P EST MOD 30 MIN: CPT | Mod: 95,,, | Performed by: INTERNAL MEDICINE

## 2020-07-22 PROCEDURE — 1159F MED LIST DOCD IN RCRD: CPT | Mod: 95,,, | Performed by: INTERNAL MEDICINE

## 2020-07-22 PROCEDURE — 1159F PR MEDICATION LIST DOCUMENTED IN MEDICAL RECORD: ICD-10-PCS | Mod: 95,,, | Performed by: INTERNAL MEDICINE

## 2020-07-22 PROCEDURE — 99214 PR OFFICE/OUTPT VISIT, EST, LEVL IV, 30-39 MIN: ICD-10-PCS | Mod: 95,,, | Performed by: INTERNAL MEDICINE

## 2020-07-22 PROCEDURE — 1101F PR PT FALLS ASSESS DOC 0-1 FALLS W/OUT INJ PAST YR: ICD-10-PCS | Mod: CPTII,95,, | Performed by: INTERNAL MEDICINE

## 2020-07-22 PROCEDURE — 1101F PT FALLS ASSESS-DOCD LE1/YR: CPT | Mod: CPTII,95,, | Performed by: INTERNAL MEDICINE

## 2020-07-22 RX ORDER — ALLOPURINOL 300 MG/1
300 TABLET ORAL DAILY
Qty: 90 TABLET | Refills: 4 | Status: SHIPPED | OUTPATIENT
Start: 2020-07-22 | End: 2020-09-16 | Stop reason: SDUPTHER

## 2020-07-22 ASSESSMENT — ROUTINE ASSESSMENT OF PATIENT INDEX DATA (RAPID3)
PSYCHOLOGICAL DISTRESS SCORE: 1.1
AM STIFFNESS SCORE: 0, NO
FATIGUE SCORE: 5
PAIN SCORE: 0
MDHAQ FUNCTION SCORE: 0
PATIENT GLOBAL ASSESSMENT SCORE: 3
TOTAL RAPID3 SCORE: 1

## 2020-07-22 NOTE — PROGRESS NOTES
Chief Complaint   Patient presents with    Follow-up         History of presenting illness    The patient location is: home  The chief complaint leading to consultation is: gout    Visit type: audiovisual    Face to Face time with patient: 30  minutes of total time spent on the encounter, which includes face to face time and non-face to face time preparing to see the patient (eg, review of tests), Obtaining and/or reviewing separately obtained history, Documenting clinical information in the electronic or other health record, Independently interpreting results (not separately reported) and communicating results to the patient/family/caregiver, or Care coordination (not separately reported).       Each patient to whom he or she provides medical services by telemedicine is:  (1) informed of the relationship between the physician and patient and the respective role of any other health care provider with respect to management of the patient; and (2) notified that he or she may decline to receive medical services by telemedicine and may withdraw from such care at any time.    Notes:       69 year old white male presented with recurrent right knee effusions many many years ago  This led to 3 surgeries and finally it was diagnosed to be gout in 2000    For years he did well    He was on allopurinol daily dose not known     Over the last few years he had only few attacks  Then when allopurinol was taken off he has had more attacks  But the last two months attacks are severe   Left knee vs right knee vs ankles    Diet  -doesn't eat shell fish,doesnt drink alcohol,doesnt eat red meat  Drinks lemon/lime  Stopped drinking sodas  Sugar and salt not restricted     uric acid 9.1 in aug 2019    Attack :  -starts with a twitch,then severe pain,swelling   Lasts for 2 weeks  Interim period no symptoms at all  Colchicine has never been prescribed the right way and hence it doesn't help  Indomethacin helps    I saw him October  2019    Resumed daily allopurinol 300 mg and colchicine 0.6 mg daily    He has done well  No gout attacks    Uric acid dropped to 6.3/6    We then stopped colchicine and continued allopurinol 300 mg for 6 months    June 2020   One episode of kidney stones    Not sure if calcium or urate stones   Had lithotripsy     No gout attacks     Past history : depression,HTN,HLD,GERD,Gout,sleep apnea   Prostate cancer s/p surgery    Family history : diabetes,kidney disease,stroke,heart disease     Social history : not a smoker      Review of Systems   Constitutional: Negative for fever and unexpected weight change.   HENT: Negative for trouble swallowing.    Eyes: Negative for redness.   Respiratory: Negative for cough and shortness of breath.    Cardiovascular: Negative for chest pain.   Gastrointestinal: Negative for constipation and diarrhea.   Genitourinary: Negative for genital sores.   Skin: Negative for rash.   Neurological: Negative for headaches.   Hematological: Does not bruise/bleed easily.          No skin rashes,malar rash,photosensitivity   No telangiectasias   No calcinosis   No psoriasis   No patchy alopecia   No oral and nasal ulcers   No dry eyes and dry mouth   No pleurisy or any cardiopulmonary complaints   No dysphagia,diplopia and dysphonia and muscle weakness   No n/v/d/c   No acid reflux+   No raynaud's+   No digital ulcers   No cytopenias   No renal issues   No blood clots   No fever,chills,night sweats,weight loss and loss of appetite   No pregnancy losses/pre term deliveries /pregnancy complications   No new onset headaches   No recurrent conjunctivitis or uveitis or scleritis or episcleritis   No chronic or bloody diarrhea with no u colitis or crohn's /inflammatory bowel disease   No penile and urethral  d/c/STDs/no ulcers     Labs     Mild anemia  UTD cancer screening  Recent CMPs normal  Uric acid off allopurinol  : goes up to 9.1  When he took allopurinol uric acid stayed between 5.7 to  6.4    Xrays  Left foot and ankle nml    There is currently no information documented on the homunculus. Go to the Rheumat[facility] activity [facility].    Physical Exam   Constitutional: He is oriented to person, place, and time and well-developed, well-nourished, and in no distress. No distress.   HENT:   Head: Normocephalic.   Mouth/Throat: Oropharynx is clear and moist.   Eyes: Conjunctivae are normal. Pupils are equal, round, and reactive to light. Right eye exhibits no discharge. Left eye exhibits no discharge. No scleral icterus.   Neck: Normal range of motion. No thyromegaly present.   Cardiovascular: Normal rate, regular rhythm, normal heart sounds and intact distal pulses.    Pulmonary/Chest: Effort normal and breath sounds normal. No stridor.   Abdominal: Soft. Bowel sounds are normal.   Lymphadenopathy:     He has no cervical adenopathy.   Neurological: He is alert and oriented to person, place, and time.   Skin: Skin is warm. No rash noted. He is not diaphoretic.     Psychiatric: Affect and judgment normal.   Musculoskeletal: Normal range of motion.           Assessment     69 year old white male with depression,HTN,HLD,GERD,Gout,sleep apnea   Prostate cancer s/p surgery    Has had gout for years in the knees initially went undiagnosed    In 2000,rheumatology evaluation confirmed the diagnosis  Allopurinol 300 mg helped him for years  When the medication was taken off the attacks recurred,initially less frequency and then extremely frequent in the knees and ankles     Came with uric acid of 9.1    Initial visit : left ankle tender and swollen  No tophi/? A small deposit on the ankle xray?? Unclear     Well controlled gout with normal uric acid of 6/6.3    On allopurinol 300 mg and colchicine 0.6 mg     1. Idiopathic chronic gout of multiple sites without tophus    2. Gout synovitis            F/u problem    Plan    Stop the colchicine    Continue allopurinol 300 mg  daily    Dietary modifications discussed : gave a hand out of all foods to avoid : avoid organ meat,red meat,seafood,shell fish,anchovies,sardines,alcohol particularly beer,fructose containing soft drinks   Its ok to consume moderate wine,diet drinks,dairy proteins,coffee   Eat cherries   Eat purine rich vegetables     Avoid beta blockers and diuretics   Losartan preferred anit hypertensive   Fenofibrate better hyperlipidemic drug     Blood pressure monitoring   Weight loss suggested     Labs today and every 6 months       Geo was seen today for follow-up.    Diagnoses and all orders for this visit:    Idiopathic chronic gout of multiple sites without tophus  -     Uric acid; Future    Gout synovitis  -     allopurinoL (ZYLOPRIM) 300 MG tablet; Take 1 tablet (300 mg total) by mouth once daily.

## 2020-07-22 NOTE — PROGRESS NOTES
Rapid3 Question Responses and Scores 7/21/2020   MDHAQ Score 0   Psychologic Score 1.1   Pain Score 0   When you awakened in the morning OVER THE LAST WEEK, did you feel stiff? No   Fatigue Score 5   Global Health Score 3   RAPID3 Score 1

## 2021-02-23 ENCOUNTER — PATIENT MESSAGE (OUTPATIENT)
Dept: RHEUMATOLOGY | Facility: CLINIC | Age: 71
End: 2021-02-23

## 2021-03-27 ENCOUNTER — IMMUNIZATION (OUTPATIENT)
Dept: PRIMARY CARE CLINIC | Facility: CLINIC | Age: 71
End: 2021-03-27
Payer: MEDICARE

## 2021-03-27 DIAGNOSIS — Z23 NEED FOR VACCINATION: Primary | ICD-10-CM

## 2021-03-27 PROCEDURE — 91300 COVID-19, MRNA, LNP-S, PF, 30 MCG/0.3 ML DOSE VACCINE: CPT | Mod: PBBFAC | Performed by: EMERGENCY MEDICINE

## 2021-04-17 ENCOUNTER — IMMUNIZATION (OUTPATIENT)
Dept: PRIMARY CARE CLINIC | Facility: CLINIC | Age: 71
End: 2021-04-17
Payer: MEDICARE

## 2021-04-17 DIAGNOSIS — Z23 NEED FOR VACCINATION: Primary | ICD-10-CM

## 2021-04-17 PROCEDURE — 0002A COVID-19, MRNA, LNP-S, PF, 30 MCG/0.3 ML DOSE VACCINE: ICD-10-PCS | Mod: CV19,S$GLB,, | Performed by: FAMILY MEDICINE

## 2021-04-17 PROCEDURE — 91300 COVID-19, MRNA, LNP-S, PF, 30 MCG/0.3 ML DOSE VACCINE: CPT | Mod: S$GLB,,, | Performed by: FAMILY MEDICINE

## 2021-04-17 PROCEDURE — 0002A COVID-19, MRNA, LNP-S, PF, 30 MCG/0.3 ML DOSE VACCINE: CPT | Mod: CV19,S$GLB,, | Performed by: FAMILY MEDICINE

## 2021-04-17 PROCEDURE — 91300 COVID-19, MRNA, LNP-S, PF, 30 MCG/0.3 ML DOSE VACCINE: ICD-10-PCS | Mod: S$GLB,,, | Performed by: FAMILY MEDICINE

## 2021-05-27 ENCOUNTER — PATIENT MESSAGE (OUTPATIENT)
Dept: RHEUMATOLOGY | Facility: CLINIC | Age: 71
End: 2021-05-27

## 2021-08-09 ENCOUNTER — OFFICE VISIT (OUTPATIENT)
Dept: RHEUMATOLOGY | Facility: CLINIC | Age: 71
End: 2021-08-09
Payer: MEDICARE

## 2021-08-09 DIAGNOSIS — M10.9 GOUT SYNOVITIS: ICD-10-CM

## 2021-08-09 DIAGNOSIS — M1A.09X0 IDIOPATHIC CHRONIC GOUT OF MULTIPLE SITES WITHOUT TOPHUS: Primary | ICD-10-CM

## 2021-08-09 PROCEDURE — 3044F HG A1C LEVEL LT 7.0%: CPT | Mod: CPTII,95,, | Performed by: INTERNAL MEDICINE

## 2021-08-09 PROCEDURE — 1159F MED LIST DOCD IN RCRD: CPT | Mod: CPTII,95,, | Performed by: INTERNAL MEDICINE

## 2021-08-09 PROCEDURE — 3044F PR MOST RECENT HEMOGLOBIN A1C LEVEL <7.0%: ICD-10-PCS | Mod: CPTII,95,, | Performed by: INTERNAL MEDICINE

## 2021-08-09 PROCEDURE — 1160F RVW MEDS BY RX/DR IN RCRD: CPT | Mod: CPTII,95,, | Performed by: INTERNAL MEDICINE

## 2021-08-09 PROCEDURE — 1160F PR REVIEW ALL MEDS BY PRESCRIBER/CLIN PHARMACIST DOCUMENTED: ICD-10-PCS | Mod: CPTII,95,, | Performed by: INTERNAL MEDICINE

## 2021-08-09 PROCEDURE — 1159F PR MEDICATION LIST DOCUMENTED IN MEDICAL RECORD: ICD-10-PCS | Mod: CPTII,95,, | Performed by: INTERNAL MEDICINE

## 2021-08-09 PROCEDURE — 99214 PR OFFICE/OUTPT VISIT, EST, LEVL IV, 30-39 MIN: ICD-10-PCS | Mod: 95,,, | Performed by: INTERNAL MEDICINE

## 2021-08-09 PROCEDURE — 99214 OFFICE O/P EST MOD 30 MIN: CPT | Mod: 95,,, | Performed by: INTERNAL MEDICINE

## 2021-08-09 RX ORDER — ALLOPURINOL 100 MG/1
100 TABLET ORAL DAILY
Qty: 90 TABLET | Refills: 2 | Status: SHIPPED | OUTPATIENT
Start: 2021-08-09 | End: 2021-11-07

## 2021-08-09 RX ORDER — COLCHICINE 0.6 MG/1
0.6 TABLET ORAL DAILY
Qty: 90 TABLET | Refills: 2 | Status: SHIPPED | OUTPATIENT
Start: 2021-08-09 | End: 2022-03-14 | Stop reason: SDUPTHER

## 2021-08-09 RX ORDER — ALLOPURINOL 100 MG/1
100 TABLET ORAL DAILY
Qty: 90 TABLET | Refills: 2 | Status: SHIPPED | OUTPATIENT
Start: 2021-08-09 | End: 2021-08-09

## 2021-08-09 RX ORDER — ALLOPURINOL 300 MG/1
300 TABLET ORAL DAILY
Qty: 90 TABLET | Refills: 3 | Status: SHIPPED | OUTPATIENT
Start: 2021-08-09 | End: 2021-11-07

## 2021-08-09 RX ORDER — ALLOPURINOL 300 MG/1
300 TABLET ORAL DAILY
Qty: 90 TABLET | Refills: 3 | Status: SHIPPED | OUTPATIENT
Start: 2021-08-09 | End: 2021-08-09

## 2021-08-11 LAB
ALBUMIN SERPL-MCNC: 4.4 G/DL (ref 3.6–5.1)
ALBUMIN/GLOB SERPL: 1.7 (CALC) (ref 1–2.5)
ALP SERPL-CCNC: 40 U/L (ref 35–144)
ALT SERPL-CCNC: 20 U/L (ref 9–46)
AST SERPL-CCNC: 19 U/L (ref 10–35)
BASOPHILS # BLD AUTO: 29 CELLS/UL (ref 0–200)
BASOPHILS NFR BLD AUTO: 0.6 %
BILIRUB SERPL-MCNC: 0.4 MG/DL (ref 0.2–1.2)
BUN SERPL-MCNC: 17 MG/DL (ref 7–25)
BUN/CREAT SERPL: ABNORMAL (CALC) (ref 6–22)
CALCIUM SERPL-MCNC: 9.4 MG/DL (ref 8.6–10.3)
CHLORIDE SERPL-SCNC: 103 MMOL/L (ref 98–110)
CO2 SERPL-SCNC: 28 MMOL/L (ref 20–32)
CREAT SERPL-MCNC: 1.05 MG/DL (ref 0.7–1.25)
EOSINOPHIL # BLD AUTO: 110 CELLS/UL (ref 15–500)
EOSINOPHIL NFR BLD AUTO: 2.3 %
ERYTHROCYTE [DISTWIDTH] IN BLOOD BY AUTOMATED COUNT: 13.1 % (ref 11–15)
GLOBULIN SER CALC-MCNC: 2.6 G/DL (CALC) (ref 1.9–3.7)
GLUCOSE SERPL-MCNC: 121 MG/DL (ref 65–99)
HCT VFR BLD AUTO: 43 % (ref 38.5–50)
HGB BLD-MCNC: 14.1 G/DL (ref 13.2–17.1)
LYMPHOCYTES # BLD AUTO: 1032 CELLS/UL (ref 850–3900)
LYMPHOCYTES NFR BLD AUTO: 21.5 %
MCH RBC QN AUTO: 30.5 PG (ref 27–33)
MCHC RBC AUTO-ENTMCNC: 32.8 G/DL (ref 32–36)
MCV RBC AUTO: 92.9 FL (ref 80–100)
MONOCYTES # BLD AUTO: 499 CELLS/UL (ref 200–950)
MONOCYTES NFR BLD AUTO: 10.4 %
NEUTROPHILS # BLD AUTO: 3130 CELLS/UL (ref 1500–7800)
NEUTROPHILS NFR BLD AUTO: 65.2 %
PLATELET # BLD AUTO: 214 THOUSAND/UL (ref 140–400)
PMV BLD REES-ECKER: 10.5 FL (ref 7.5–12.5)
POTASSIUM SERPL-SCNC: 4.3 MMOL/L (ref 3.5–5.3)
PROT SERPL-MCNC: 7 G/DL (ref 6.1–8.1)
RBC # BLD AUTO: 4.63 MILLION/UL (ref 4.2–5.8)
SODIUM SERPL-SCNC: 139 MMOL/L (ref 135–146)
URATE SERPL-MCNC: 5.6 MG/DL (ref 4–8)
WBC # BLD AUTO: 4.8 THOUSAND/UL (ref 3.8–10.8)

## 2021-11-29 ENCOUNTER — TELEPHONE (OUTPATIENT)
Dept: SURGERY | Facility: CLINIC | Age: 71
End: 2021-11-29
Payer: MEDICARE

## 2024-04-05 ENCOUNTER — TELEPHONE (OUTPATIENT)
Dept: SURGERY | Facility: CLINIC | Age: 74
End: 2024-04-05
Payer: MEDICARE

## 2024-04-05 DIAGNOSIS — Z12.11 SCREENING FOR COLON CANCER: Primary | ICD-10-CM

## 2024-04-05 RX ORDER — SODIUM, POTASSIUM,MAG SULFATES 17.5-3.13G
1 SOLUTION, RECONSTITUTED, ORAL ORAL DAILY
Qty: 1 KIT | Refills: 0 | Status: SHIPPED | OUTPATIENT
Start: 2024-04-05 | End: 2024-04-07

## 2024-04-05 NOTE — TELEPHONE ENCOUNTER
The patient has been advised the Colonoscopy Prep Kit will be ordered from the patient's verified preferred pharmacy on file. The medication can  be picked up by the patient, or the patient's designated representative.The patient was further explained the Colonoscopy Prep instructions will be mailed to the patient verified mailing address on file, or put onto the [employer] portal, whichever method of delivery the patient prefers.Additionally this patient was informed,not to follow the instructions that comes with the bowel prep medication. However, the patient was instructed to please follow the Colonoscopy Bowel Prep instructions that's being provided by the . The patient was asked to please to follow the Colonoscopy Prep instructions being provided as precisely,and  meticulously as possible.The patient was advised you  will receive a follow up phone call to summarize the Colonoscopy Prep instructions prior to the scheduled Colonoscopy procedure date. At this time the patient will be given an opportunity to ask any questions regarding the Colonoscopy procedure, and it's associated Bowel Prep instructions.

## 2024-04-05 NOTE — TELEPHONE ENCOUNTER
Called patient in reference to a referral to Colorectal Surgery for colon cancer screening. Patient verbally consented to a Colonoscopy and requested to be scheduled for a Colonoscopy on 05/03/2024 Patient was advised a designated  is required on the day of the Colonoscopy to drive the patient home and the  must be at least. 18 years old.Colonoscopy Prep instructions were thoroughly explained and discussed with the patient.It was emphasized, and reiterated to the patient, to please not to follow the bowel prep instructions that comes with the bowel prep package.However, to please follow the prep instructions that will be received in the mail,or via the [employer] portal, or by both modes of delivery, which ever method of delivery the patient prefers,from the [facility]   Patient acknowledges understanding Prep instructions as explained and discussed on the phone.. Patient was advised the Colonoscopy Prep instructions discussed and explained on the phone,are being mailed out to the patient's verified address on file,or put onto the [employer] portal,or both methods of delivery, whichever the patient prefers. Patient's address on file was verified with the patient for accuracy of mailing. Patient's medications on file was reviewed with the patient for accuracy of information. Patient denies taking  any other medications other than those listed and verified on medication profile.Patient was explained the Colonoscopy will be performed here at [facility]. Patient was further explained the Pre-Op will call one day prior to the procedure date, to discuss Pre-Op instructions;and what time to report for the Colonoscopy. The patient was given the opportunity to ask any questions about the Colonoscopy. No further issues were discussed.

## 2024-06-28 ENCOUNTER — TELEPHONE (OUTPATIENT)
Dept: CARDIOLOGY | Facility: CLINIC | Age: 74
End: 2024-06-28
Payer: MEDICARE

## 2024-06-28 NOTE — TELEPHONE ENCOUNTER
Provider currently booked through October.  Will contact next week for next available in November.

## 2024-06-28 NOTE — TELEPHONE ENCOUNTER
----- Message from Alycia Stubbs sent at 6/28/2024 11:14 AM CDT -----  Regarding: Appt Referral  Contact: Chante Conklin called to schedule from referral.       Please call back to further [phone]

## 2024-07-02 ENCOUNTER — TELEPHONE (OUTPATIENT)
Dept: CARDIOLOGY | Facility: CLINIC | Age: 74
End: 2024-07-02
Payer: MEDICARE

## 2024-07-02 NOTE — TELEPHONE ENCOUNTER
----- Message from Rachel Munoz sent at 7/2/2024  3:47 PM CDT -----  Type:  Sooner Apoointment Request    Caller is requesting a sooner appointment.  Caller declined first available appointment listed below.  Caller will not accept being placed on the waitlist and is requesting a message be sent to doctor.    Name of Caller:pt  When is the first available appointment?November  Symptoms:Bundle branch block [I45.4]  Nonrheumatic aortic valve insufficiency [I35.1]  Ventricular hypertrophy [I51.7]   Would the patient rather a call back or a response via [facility]? Call  Best Call Back Number: [phone]  Additional Information:

## 2024-07-02 NOTE — TELEPHONE ENCOUNTER
Spoke with patient, informed that provider's first available appointment is his scheduled date 11/01/2024.  Offered to check another location and provider, patient declined.  Patient will go online and look for another doctor.

## 2024-09-05 ENCOUNTER — OFFICE VISIT (OUTPATIENT)
Dept: CARDIOLOGY | Facility: CLINIC | Age: 74
End: 2024-09-05
Payer: MEDICARE

## 2024-09-05 VITALS
HEART RATE: 70 BPM | HEIGHT: 70 IN | RESPIRATION RATE: 16 BRPM | SYSTOLIC BLOOD PRESSURE: 118 MMHG | DIASTOLIC BLOOD PRESSURE: 70 MMHG | BODY MASS INDEX: 26.63 KG/M2 | OXYGEN SATURATION: 97 % | WEIGHT: 186 LBS

## 2024-09-05 DIAGNOSIS — R07.89 OTHER CHEST PAIN: ICD-10-CM

## 2024-09-05 DIAGNOSIS — K21.9 GASTROESOPHAGEAL REFLUX DISEASE WITHOUT ESOPHAGITIS: ICD-10-CM

## 2024-09-05 DIAGNOSIS — E78.2 MIXED HYPERLIPIDEMIA: ICD-10-CM

## 2024-09-05 DIAGNOSIS — C61 PROSTATIC CANCER: ICD-10-CM

## 2024-09-05 DIAGNOSIS — I10 PRIMARY HYPERTENSION: ICD-10-CM

## 2024-09-05 DIAGNOSIS — G47.30 SLEEP APNEA, UNSPECIFIED TYPE: ICD-10-CM

## 2024-09-05 DIAGNOSIS — R94.31 NONSPECIFIC ABNORMAL ELECTROCARDIOGRAM (ECG) (EKG): Primary | ICD-10-CM

## 2024-09-05 DIAGNOSIS — I35.1 NONRHEUMATIC AORTIC VALVE INSUFFICIENCY: ICD-10-CM

## 2024-09-05 DIAGNOSIS — I51.7 VENTRICULAR HYPERTROPHY: ICD-10-CM

## 2024-09-05 PROCEDURE — 1126F AMNT PAIN NOTED NONE PRSNT: CPT | Mod: CPTII,S$GLB,, | Performed by: INTERNAL MEDICINE

## 2024-09-05 PROCEDURE — 3074F SYST BP LT 130 MM HG: CPT | Mod: CPTII,S$GLB,, | Performed by: INTERNAL MEDICINE

## 2024-09-05 PROCEDURE — 3288F FALL RISK ASSESSMENT DOCD: CPT | Mod: CPTII,S$GLB,, | Performed by: INTERNAL MEDICINE

## 2024-09-05 PROCEDURE — 1160F RVW MEDS BY RX/DR IN RCRD: CPT | Mod: CPTII,S$GLB,, | Performed by: INTERNAL MEDICINE

## 2024-09-05 PROCEDURE — 99999 PR PBB SHADOW E&M-EST. PATIENT-LVL V: CPT | Mod: PBBFAC,,, | Performed by: INTERNAL MEDICINE

## 2024-09-05 PROCEDURE — 99204 OFFICE O/P NEW MOD 45 MIN: CPT | Mod: S$GLB,,, | Performed by: INTERNAL MEDICINE

## 2024-09-05 PROCEDURE — 1101F PT FALLS ASSESS-DOCD LE1/YR: CPT | Mod: CPTII,S$GLB,, | Performed by: INTERNAL MEDICINE

## 2024-09-05 PROCEDURE — 1159F MED LIST DOCD IN RCRD: CPT | Mod: CPTII,S$GLB,, | Performed by: INTERNAL MEDICINE

## 2024-09-05 PROCEDURE — 3044F HG A1C LEVEL LT 7.0%: CPT | Mod: CPTII,S$GLB,, | Performed by: INTERNAL MEDICINE

## 2024-09-05 PROCEDURE — 3008F BODY MASS INDEX DOCD: CPT | Mod: CPTII,S$GLB,, | Performed by: INTERNAL MEDICINE

## 2024-09-05 PROCEDURE — 4010F ACE/ARB THERAPY RXD/TAKEN: CPT | Mod: CPTII,S$GLB,, | Performed by: INTERNAL MEDICINE

## 2024-09-05 PROCEDURE — 3078F DIAST BP <80 MM HG: CPT | Mod: CPTII,S$GLB,, | Performed by: INTERNAL MEDICINE

## 2024-09-05 NOTE — PROGRESS NOTES
Subjective:    Patient ID:  Geo Pretty is a 74 y.o. male patient here for evaluation Establish Care (Pcp sending pt over, [facility]) and Fatigue      History of Present Illness:     Is a 73-year-old gentleman with history of dyslipidemia arterial hypertension has been experiencing lot of musculoskeletal pains all over.  Intermittently he was also has some chest pains with varying intensity and duration.  He is a  by trade does a lot of heavy work and doing physical things.  He feels tired and exhausted more so lately than before.  He does have history of sleep apnea has been compliant with his CPAP machine for over 10 years duration no symptoms of PND or orthopnea no significant pedal edema noted.    He has completed an echocardiogram that shows preserved LV function with mild concentric hypertrophy and valvular sclerosis  noted.  Patient was noted to have abnormal EKG and is referred here by his primary care physician Dr. Seo    Review of patient's allergies indicates:   Allergen Reactions    Ibuprofen      Other reaction(s): Swelling    Penicillin     Penicillins      Other reaction(s): Anaphylaxis       Past Medical History:   Diagnosis Date    Depression     Elevated PSA     Fatty liver     GERD (gastroesophageal reflux disease)     Gout     Hearing impaired     Hyperlipidemia     Hypertension     OA (osteoarthritis) of knee     MADELIN (obstructive sleep apnea)     uses cpap at night    Prostate cancer 08/2018     Past Surgical History:   Procedure Laterality Date    COLONOSCOPY  10/31/2018    COLONOSCOPY N/A 10/31/2018    Procedure: COLONOSCOPY;  Surgeon: Sly Sampson MD;  Location: [facility];  Service: Endoscopy;  Laterality: N/A;    COLONOSCOPY  05/03/2024    COLONOSCOPY N/A 5/3/2024    Procedure: COLONOSCOPY;  Surgeon: Nereyda Schumacher MD;  Location: [facility];  Service: Gastroenterology;  Laterality: N/A;    CYSTOSCOPY W/ RETROGRADES Bilateral 08/21/2018    Procedure: CYSTOSCOPY, WITH  RETROGRADE PYELOGRAM;  Surgeon: Adelso Cash MD;  Location: [facility] OR;  Service: Urology;  Laterality: Bilateral;    HERNIA REPAIR      left inguinal    KNEE SURGERY      left x3    pellets inserted into prostate   12/10/2018    for prostate cancer    PROSTATE BIOPSY      TONSILLECTOMY      TRANSRECTAL BIOPSY OF PROSTATE WITH ULTRASOUND GUIDANCE N/A 08/21/2018    Procedure: BIOPSY, PROSTATE, TRANSRECTAL APPROACH, WITH US GUIDANCE;  Surgeon: Adelso Cash MD;  Location: [facility] OR;  Service: Urology;  Laterality: N/A;  Dr. Cash wants to do this case in [facility] cases to start around 12 noon. [place] is aware.     Social History     Tobacco Use    Smoking status: Never    Smokeless tobacco: Never   Substance Use Topics    Alcohol use: No     Alcohol/week: 0.0 standard drinks of alcohol    Drug use: No        Review of Systems   As noted in HPI in addition     Constitutional: Negative for chills, no fevers but positive for fatigue and tiredness.    Eyes: No double vision, No blurred vision  Neuro: No headaches, No dizziness  Respiratory: Negative for cough, shortness of breath and wheezing.    Cardiovascular:  Intermittent episodes of atypical chest pains denies having palpitations or swelling   Gastrointestinal: Negative for abdominal pain, No melena, diarrhea, nausea and vomiting.   Genitourinary: Negative for dysuria positive for nocturia Negative for hematuria  Skin: Negative for bruising, Negative for edema or discoloration noted.   Endocrine: Negative for polyphagia, Negative for heat intolerance, Negative for cold intolerance  Psychiatric: Negative for depression, Negative for anxiety, Negative for memory loss  Musculoskeletal: Negative for neck pain, Negative for muscle weakness, Negative for back pain          Objective        Vitals:    09/05/24 1056   BP: 118/70   Pulse: 70   Resp: 16       LIPIDS - LAST 2   Lab Results   Component Value Date    CHOL 133 08/21/2024    CHOL 191 05/17/2024     HDL 36 (L) 08/21/2024    HDL 39 (L) 05/17/2024    LDLCALC 73 08/21/2024    LDLCALC 120 (H) 05/17/2024    TRIG 162 (H) 08/21/2024    TRIG 196 (H) 05/17/2024    CHOLHDL 3.7 08/21/2024    CHOLHDL 4.9 05/17/2024       CBC - LAST 2  Lab Results   Component Value Date    WBC 7.9 08/21/2024    WBC 6.5 05/17/2024    RBC 5.01 08/21/2024    RBC 4.66 05/17/2024    HGB 15.5 08/21/2024    HGB 14.3 05/17/2024    HCT 47.5 08/21/2024    HCT 43.6 05/17/2024    MCV 94.8 08/21/2024    MCV 93.6 05/17/2024    MCH 30.9 08/21/2024    MCH 30.7 05/17/2024    MCHC 32.6 08/21/2024    MCHC 32.8 05/17/2024    RDW 13.3 08/21/2024    RDW 12.9 05/17/2024     08/21/2024     05/17/2024    MPV 10.2 08/21/2024    MPV 10.4 05/17/2024    GRAN 6.2 05/09/2020    GRAN 76.7 (H) 05/09/2020    LYMPH 1,193 08/21/2024    LYMPH 15.1 08/21/2024    MONO 1,035 (H) 08/21/2024    MONO 13.1 08/21/2024    BASO 32 08/21/2024    BASO 33 05/17/2024    NRBC 0 01/29/2020    NRBC 0 10/18/2019       CHEMISTRY & LIVER FUNCTION - LAST 2  Lab Results   Component Value Date     08/21/2024     05/17/2024    K 4.7 08/21/2024    K 4.8 05/17/2024     08/21/2024     05/17/2024    CO2 29 08/21/2024    CO2 29 05/17/2024    ANIONGAP 13 05/09/2020    ANIONGAP 10 01/29/2020    BUN 16 08/21/2024    BUN 16 05/17/2024    CREATININE 0.89 08/21/2024    CREATININE 0.86 05/17/2024     (H) 08/21/2024    GLU 98 05/17/2024    CALCIUM 9.8 08/21/2024    CALCIUM 9.8 05/17/2024    ALBUMIN 4.5 08/21/2024    ALBUMIN 4.9 05/17/2024    PROT 6.9 08/21/2024    PROT 7.0 05/17/2024    ALKPHOS 44 05/09/2020    ALKPHOS 36 04/30/2020    ALT 21 08/21/2024    ALT 22 05/17/2024    AST 18 08/21/2024    AST 18 05/17/2024    BILITOT 0.5 08/21/2024    BILITOT 0.4 05/17/2024        CARDIAC PROFILE - LAST 2  Lab Results   Component Value Date    TROPONINI <0.01 12/26/2017        COAGULATION - LAST 2  Lab Results   Component Value Date    INR 1.0 08/17/2018    APTT 26.9  08/17/2018       ENDOCRINE & PSA - LAST 2  Lab Results   Component Value Date    HGBA1C 5.7 (H) 08/21/2024    HGBA1C 5.7 (H) 05/17/2024    TSH 3.10 08/21/2024    TSH 1.58 05/17/2024    PSA 2.7 12/15/2014    PSA 3.94 05/17/2013        ECHOCARDIOGRAM RESULTS  Results for orders placed during the hospital encounter of 06/27/24    Echo    Interpretation Summary    Left Ventricle: The left ventricle is dilated. Mildly to moderately increased wall thickness. Difficult to exclude basal inferior hypokinesis. There is normal systolic function with a visually estimated ejection fraction of 55 - 60%. There is normal diastolic function.    Aortic Valve: The aortic valve is a trileaflet valve. Mildly calcified cusps. Mildly restricted motion. There is mild aortic regurgitation.    Mitral Valve: There is no stenosis.  There is trace to mild regurgitation.    Right Ventricle: Normal right ventricular cavity size. Systolic function is normal.    Pulmonic Valve: There is no stenosis.    IVC/SVC: Intermediate venous pressure at 8 mmHg.      CURRENT/PREVIOUS VISIT EKG  Results for orders placed or performed during the hospital encounter of 06/27/24   EKG 12-lead    Collection Time: 06/27/24  7:09 AM   Result Value Ref Range    QRS Duration 154 ms    [facility] QTC Calculation 446 ms    Narrative    Test Reason : R42,    Vent. Rate : 068 BPM     Atrial Rate : 068 BPM     P-R Int : 176 ms          QRS Dur : 154 ms      QT Int : 420 ms       P-R-T Axes : 058 -18 038 degrees     QTc Int : 446 ms    Normal sinus rhythm  Right bundle branch block  Abnormal ECG  When compared with ECG of 26-DEC-2017 15:43,  Right bundle branch block has replaced Incomplete right bundle branch block  Confirmed by Saima PRETTY, Miguel MARTIN ([id number]) on 6/27/2024 11:49:37 AM    Referred By: HARVEY OSPINA           Confirmed By:Miguel Landaverde MD     No valid procedures specified.   No results found for this or any previous visit.    No valid procedures  specified.          PREVIOUS STRESS TEST              PREVIOUS ANGIOGRAM        PHYSICAL EXAM    GENERAL: well built, well nourished, well-developed in no apparent distress alert and oriented.   HEENT: Normocephalic. Pupils normal and conjunctivae normal.  Mucous membranes normal, no cyanosis or icterus, trachea central,no pallor or icterus is noted..   NECK: No JVD. No bruit..   THYROID: Thyroid not enlarged. No nodules present..   CARDIAC: Regular rate and rhythm. S1 is normal.S2 is normal.No gallops, clicks or murmurs noted at this time.  CHEST ANATOMY: normal.   LUNGS: Clear to auscultation. No wheezing or rhonchi..   ABDOMEN: Soft no masses or organomegaly.  No abdomen pulsations or bruits.  Normal bowel sounds. No pulsations and no masses felt, No guarding or rebound.   EXTREMITIES: No cyanosis, clubbing or edema noted at this time., no calf tenderness bilaterally.   PERIPHERAL VASCULAR SYSTEM: Good palpable distal pulses.   CENTRAL NERVOUS SYSTEM: No focal motor or sensory deficits noted.   SKIN: Skin without lesions, moist, well perfused.   MUSCLE STRENGTH & TONE: No noteable weakness, atrophy or abnormal movement.     I HAVE REVIEWED :    The vital signs, nurses notes, and all the pertinent radiology and labs.  EKG obtained on June 27, 2024 shows sinus rhythm right bundle branch block morphology.      Current Outpatient Medications   Medication Instructions    allopurinoL (ZYLOPRIM) 300 mg, Oral    allopurinoL (ZYLOPRIM) 100 mg, Oral    colchicine (COLCRYS) 0.6 mg, Oral, Daily    ergocalciferol (VITAMIN D2) 50,000 Units, Oral, Every 30 days    ezetimibe (ZETIA) 10 mg, Oral, Daily    irbesartan (AVAPRO) 75 mg, Oral, Daily    pantoprazole (PROTONIX) 40 MG tablet TAKE 1 TABLET BY MOUTH ONCE DAILY FOR STOMACH    sertraline (ZOLOFT) 100 mg, Oral, Daily    simvastatin (ZOCOR) 20 mg, Oral, Nightly    tadalafiL (CIALIS) 5 mg, Oral, Daily PRN          Assessment & Plan     1. Nonspecific abnormal electrocardiogram  (ECG) (EKG)  Assessment & Plan:  Patient has abnormal EKG with right bundle branch block morphology and with given his history of progressive fatigue and tiredness recommend further cardiac workup.  Obtain a symptom limited exercise stress test to evaluate for coronary insufficiency and he will need imaging study to enhance the specificity and sensitivity of this test.    Orders:  -     Nuclear Stress - Cardiology Interpreted; Future    2. Nonrheumatic aortic valve insufficiency  -     Ambulatory referral/consult to Cardiology    3. Ventricular hypertrophy  -     Ambulatory referral/consult to Cardiology    4. Gastroesophageal reflux disease without esophagitis  Assessment & Plan:  Clinically stable on pantoprazole 40 mg daily maintain the same will add magnesium oxide 400 mg a day.      5. Sleep apnea, unspecified type  Assessment & Plan:  Encouraged her to continue to use sleep assist device CPAP machine on regular basis      6. Prostatic cancer  Assessment & Plan:  He had radiate implant into those.  Conducted by urologist      7. Mixed hyperlipidemia  Assessment & Plan:  Dyslipidemia reasonably controlled on simvastatin 20 mg nightly on and Zetia 10 mg a day maintain on low-fat low-cholesterol diet.    Orders:  -     Nuclear Stress - Cardiology Interpreted; Future    8. Primary hypertension  Assessment & Plan:  Blood pressure is stable at 1 18/70 mm Hg continue on Avapro 75 mg daily and encouraged him to keep himself adequately hydrated.      9. Other chest pain  Assessment & Plan:  Patient has some recurrent episodes of atypical chest pains.  Unclear etiology he also has a lot of musculoskeletal pains as well.  Recommend to need further and obtain a stress Myoview as he was abnormal EKG as well.  He will need Myoview perfusion imaging study to enhance the specificity of the test    Orders:  -     Nuclear Stress - Cardiology Interpreted; Future           Follow up in about 6 months (around 3/5/2025).

## 2024-09-05 NOTE — ASSESSMENT & PLAN NOTE
Blood pressure is stable at 1 18/70 mm Hg continue on Avapro 75 mg daily and encouraged him to keep himself adequately hydrated.

## 2024-09-05 NOTE — ASSESSMENT & PLAN NOTE
Dyslipidemia reasonably controlled on simvastatin 20 mg nightly on and Zetia 10 mg a day maintain on low-fat low-cholesterol diet.

## 2024-09-05 NOTE — ASSESSMENT & PLAN NOTE
Patient has abnormal EKG with right bundle branch block morphology and with given his history of progressive fatigue and tiredness recommend further cardiac workup.  Obtain a symptom limited exercise stress test to evaluate for coronary insufficiency and he will need imaging study to enhance the specificity and sensitivity of this test.

## 2024-09-05 NOTE — ASSESSMENT & PLAN NOTE
Patient has some recurrent episodes of atypical chest pains.  Unclear etiology he also has a lot of musculoskeletal pains as well.  Recommend to need further and obtain a stress Myoview as he was abnormal EKG as well.  He will need Myoview perfusion imaging study to enhance the specificity of the test

## 2024-09-05 NOTE — ASSESSMENT & PLAN NOTE
Clinically stable on pantoprazole 40 mg daily maintain the same will add magnesium oxide 400 mg a day.

## 2024-09-24 ENCOUNTER — TELEPHONE (OUTPATIENT)
Dept: CARDIOLOGY | Facility: HOSPITAL | Age: 74
End: 2024-09-24

## 2024-09-24 NOTE — TELEPHONE ENCOUNTER
Patient advised, [facility]   Will need to register on the first floor at the main entrance.   Patient advised that arrival time is 6:30am.  Patient advised that he may be here about 3.5-4 hours, and may want to bring something to occupy their time, as there will be periods of waiting.    Patient advised, may take his medications prior to testing if you need to.  Advised if he needs to eat to take his medications, please keep it light, like toast and juice.    Patient advised to avoid all caffeine 12 hours prior to testing.  This includes decaf tea and coffee.    Will provide peanut butter crackers for a snack after stress test.  If patient would prefer something else, please bring a snack from home.    Wear comfortable clothing.   No lotions, oils, or powders to the upper chest area. May wear deodorant.    No metal jewelry, buttons, or zippers to the upper body.  Patient verbalizes understanding of instructions.

## 2024-09-25 ENCOUNTER — HOSPITAL ENCOUNTER (OUTPATIENT)
Dept: RADIOLOGY | Facility: HOSPITAL | Age: 74
Discharge: HOME | End: 2024-09-25
Attending: INTERNAL MEDICINE
Payer: MEDICARE

## 2024-09-25 ENCOUNTER — HOSPITAL ENCOUNTER (OUTPATIENT)
Dept: CARDIOLOGY | Facility: HOSPITAL | Age: 74
Discharge: HOME | End: 2024-09-25
Attending: INTERNAL MEDICINE
Payer: MEDICARE

## 2024-09-25 DIAGNOSIS — R07.89 OTHER CHEST PAIN: ICD-10-CM

## 2024-09-25 DIAGNOSIS — R94.31 NONSPECIFIC ABNORMAL ELECTROCARDIOGRAM (ECG) (EKG): ICD-10-CM

## 2024-09-25 DIAGNOSIS — E78.2 MIXED HYPERLIPIDEMIA: ICD-10-CM

## 2024-09-25 LAB
CV STRESS BASE HR: 68 BPM
DIASTOLIC BLOOD PRESSURE: 83 MMHG
EJECTION FRACTION- HIGH: 65 %
END DIASTOLIC INDEX-HIGH: 153 ML/M2
END DIASTOLIC INDEX-LOW: 93 ML/M2
END SYSTOLIC INDEX-HIGH: 71 ML/M2
END SYSTOLIC INDEX-LOW: 31 ML/M2
NUC REST DIASTOLIC VOLUME INDEX: 90
NUC REST EJECTION FRACTION: 63
NUC REST SYSTOLIC VOLUME INDEX: 33
NUC STRESS DIASTOLIC VOLUME INDEX: 76
NUC STRESS EJECTION FRACTION: 71 %
NUC STRESS SYSTOLIC VOLUME INDEX: 22
OHS CV CPX 1 MINUTE RECOVERY HEART RATE: 126 BPM
OHS CV CPX 85 PERCENT MAX PREDICTED HEART RATE MALE: 126
OHS CV CPX ESTIMATED METS: 5
OHS CV CPX MAX PREDICTED HEART RATE: 148
OHS CV CPX PATIENT IS FEMALE: 0
OHS CV CPX PATIENT IS MALE: 1
OHS CV CPX PEAK DIASTOLIC BLOOD PRESSURE: 81 MMHG
OHS CV CPX PEAK HEAR RATE: 129 BPM
OHS CV CPX PEAK RATE PRESSURE PRODUCT: NORMAL
OHS CV CPX PEAK SYSTOLIC BLOOD PRESSURE: 219 MMHG
OHS CV CPX PERCENT MAX PREDICTED HEART RATE ACHIEVED: 87
OHS CV CPX RATE PRESSURE PRODUCT PRESENTING: NORMAL
OHS CV INITIAL DOSE: 12.9 MCG/KG/MIN
OHS CV PEAK DOSE: 24.5 MCG/KG/MIN
RETIRED EF AND QEF - SEE NOTES: 53 %
STRESS ECHO POST EXERCISE DUR MIN: 3 MINUTES
STRESS ECHO POST EXERCISE DUR SEC: 23 SECONDS
SYSTOLIC BLOOD PRESSURE: 152 MMHG

## 2024-09-25 PROCEDURE — 93018 CV STRESS TEST I&R ONLY: CPT | Mod: ,,, | Performed by: INTERNAL MEDICINE

## 2024-09-25 PROCEDURE — A9502 TC99M TETROFOSMIN: HCPCS | Performed by: INTERNAL MEDICINE

## 2024-09-25 PROCEDURE — 78452 HT MUSCLE IMAGE SPECT MULT: CPT

## 2024-09-25 PROCEDURE — 78452 HT MUSCLE IMAGE SPECT MULT: CPT | Mod: 26,,, | Performed by: INTERNAL MEDICINE

## 2024-09-25 PROCEDURE — 93016 CV STRESS TEST SUPVJ ONLY: CPT | Mod: ,,, | Performed by: NURSE PRACTITIONER

## 2024-09-25 RX ADMIN — TETROFOSMIN 24.5 MILLICURIE: 1.38 INJECTION, POWDER, LYOPHILIZED, FOR SOLUTION INTRAVENOUS at 08:09

## 2024-09-25 RX ADMIN — TETROFOSMIN 12.9 MILLICURIE: 1.38 INJECTION, POWDER, LYOPHILIZED, FOR SOLUTION INTRAVENOUS at 06:09

## 2025-01-08 PROBLEM — M17.0 OSTEOARTHRITIS OF BOTH KNEES: Status: ACTIVE | Noted: 2025-01-08

## 2025-01-08 PROBLEM — M19.012 OSTEOARTHRITIS OF LEFT SHOULDER: Status: ACTIVE | Noted: 2025-01-08

## 2025-01-17 ENCOUNTER — OFFICE VISIT (OUTPATIENT)
Dept: RHEUMATOLOGY | Facility: CLINIC | Age: 75
End: 2025-01-17
Payer: MEDICARE

## 2025-01-17 ENCOUNTER — HOSPITAL ENCOUNTER (OUTPATIENT)
Dept: RADIOLOGY | Facility: HOSPITAL | Age: 75
Discharge: HOME | End: 2025-01-17
Attending: STUDENT IN AN ORGANIZED HEALTH CARE EDUCATION/TRAINING PROGRAM
Payer: MEDICARE

## 2025-01-17 VITALS
WEIGHT: 188.06 LBS | BODY MASS INDEX: 26.92 KG/M2 | OXYGEN SATURATION: 97 % | DIASTOLIC BLOOD PRESSURE: 70 MMHG | RESPIRATION RATE: 18 BRPM | HEIGHT: 70 IN | SYSTOLIC BLOOD PRESSURE: 121 MMHG | TEMPERATURE: 98 F | HEART RATE: 85 BPM

## 2025-01-17 DIAGNOSIS — M19.90 INFLAMMATORY ARTHROPATHY: ICD-10-CM

## 2025-01-17 DIAGNOSIS — M54.12 CERVICAL RADICULOPATHY: ICD-10-CM

## 2025-01-17 DIAGNOSIS — M10.9 GOUT, ARTHRITIS: ICD-10-CM

## 2025-01-17 DIAGNOSIS — M11.20 CALCIUM PYROPHOSPHATE DEPOSITION DISEASE (CPPD): ICD-10-CM

## 2025-01-17 DIAGNOSIS — R79.9 ABNORMAL FINDING OF BLOOD CHEMISTRY, UNSPECIFIED: ICD-10-CM

## 2025-01-17 DIAGNOSIS — R22.31 MASS OF RIGHT WRIST: ICD-10-CM

## 2025-01-17 DIAGNOSIS — M11.20 CALCIUM PYROPHOSPHATE DEPOSITION DISEASE (CPPD): Primary | ICD-10-CM

## 2025-01-17 DIAGNOSIS — Z71.89 COUNSELING AND COORDINATION OF CARE: ICD-10-CM

## 2025-01-17 PROCEDURE — 1125F AMNT PAIN NOTED PAIN PRSNT: CPT | Mod: CPTII,S$GLB,, | Performed by: STUDENT IN AN ORGANIZED HEALTH CARE EDUCATION/TRAINING PROGRAM

## 2025-01-17 PROCEDURE — 3288F FALL RISK ASSESSMENT DOCD: CPT | Mod: CPTII,S$GLB,, | Performed by: STUDENT IN AN ORGANIZED HEALTH CARE EDUCATION/TRAINING PROGRAM

## 2025-01-17 PROCEDURE — 3074F SYST BP LT 130 MM HG: CPT | Mod: CPTII,S$GLB,, | Performed by: STUDENT IN AN ORGANIZED HEALTH CARE EDUCATION/TRAINING PROGRAM

## 2025-01-17 PROCEDURE — 72040 X-RAY EXAM NECK SPINE 2-3 VW: CPT | Mod: 26,,, | Performed by: INTERNAL MEDICINE

## 2025-01-17 PROCEDURE — 1101F PT FALLS ASSESS-DOCD LE1/YR: CPT | Mod: CPTII,S$GLB,, | Performed by: STUDENT IN AN ORGANIZED HEALTH CARE EDUCATION/TRAINING PROGRAM

## 2025-01-17 PROCEDURE — 3008F BODY MASS INDEX DOCD: CPT | Mod: CPTII,S$GLB,, | Performed by: STUDENT IN AN ORGANIZED HEALTH CARE EDUCATION/TRAINING PROGRAM

## 2025-01-17 PROCEDURE — 73030 X-RAY EXAM OF SHOULDER: CPT | Mod: TC,50,FY

## 2025-01-17 PROCEDURE — 73030 X-RAY EXAM OF SHOULDER: CPT | Mod: 26,50,, | Performed by: RADIOLOGY

## 2025-01-17 PROCEDURE — 72040 X-RAY EXAM NECK SPINE 2-3 VW: CPT | Mod: TC,FY

## 2025-01-17 PROCEDURE — 4010F ACE/ARB THERAPY RXD/TAKEN: CPT | Mod: CPTII,S$GLB,, | Performed by: STUDENT IN AN ORGANIZED HEALTH CARE EDUCATION/TRAINING PROGRAM

## 2025-01-17 PROCEDURE — 99205 OFFICE O/P NEW HI 60 MIN: CPT | Mod: S$GLB,,, | Performed by: STUDENT IN AN ORGANIZED HEALTH CARE EDUCATION/TRAINING PROGRAM

## 2025-01-17 PROCEDURE — 1159F MED LIST DOCD IN RCRD: CPT | Mod: CPTII,S$GLB,, | Performed by: STUDENT IN AN ORGANIZED HEALTH CARE EDUCATION/TRAINING PROGRAM

## 2025-01-17 PROCEDURE — 99999 PR PBB SHADOW E&M-EST. PATIENT-LVL V: CPT | Mod: PBBFAC,,, | Performed by: STUDENT IN AN ORGANIZED HEALTH CARE EDUCATION/TRAINING PROGRAM

## 2025-01-17 PROCEDURE — 73562 X-RAY EXAM OF KNEE 3: CPT | Mod: TC,50,FY

## 2025-01-17 PROCEDURE — 3078F DIAST BP <80 MM HG: CPT | Mod: CPTII,S$GLB,, | Performed by: STUDENT IN AN ORGANIZED HEALTH CARE EDUCATION/TRAINING PROGRAM

## 2025-01-17 PROCEDURE — 73562 X-RAY EXAM OF KNEE 3: CPT | Mod: 26,50,, | Performed by: INTERNAL MEDICINE

## 2025-01-19 NOTE — PROGRESS NOTES
RHEUMATOLOGY OUTPATIENT CLINIC NOTE    1/19/2025    Attending Rheumatologist: Leonard Herr  Primary Care Provider: Melani Seo MD   Physician Requesting Consultation: Melani Seo MD  [address] [place] [address]  Chief Complaint/Reason For Consultation:  Gout and Osteoarthritis      Subjective:       HPI  Geo Pretty is a 74 y.o. White male with pmhx noted below referred for gout and OA   Patient reports that he has been having joint pains for a while.   His job is very physical and he has had gout in lower extremities for a significant amount of time  He started having a flare up in knees and also in wrists.   Shrimps are a trigger   Has had steroid injections in knees     Review of Systems   All other systems reviewed and are negative.       Chronic comorbid conditions affecting medical decision making today:  Past Medical History:   Diagnosis Date    Depression     Elevated PSA     Fatty liver     GERD (gastroesophageal reflux disease)     Gout     Hearing impaired     Hyperlipidemia     Hypertension     OA (osteoarthritis) of knee     MADELIN (obstructive sleep apnea)     uses cpap at night    Prostate cancer 08/2018     Past Surgical History:   Procedure Laterality Date    COLONOSCOPY  10/31/2018    COLONOSCOPY N/A 10/31/2018    Procedure: COLONOSCOPY;  Surgeon: Sly Sampson MD;  Location: [facility];  Service: Endoscopy;  Laterality: N/A;    COLONOSCOPY  05/03/2024    COLONOSCOPY N/A 05/03/2024    Procedure: COLONOSCOPY;  Surgeon: Nereyda Schumacher MD;  Location: [facility];  Service: Gastroenterology;  Laterality: N/A;    CYSTOSCOPY W/ RETROGRADES Bilateral 08/21/2018    Procedure: CYSTOSCOPY, WITH RETROGRADE PYELOGRAM;  Surgeon: Adelso Cash MD;  Location: [facility];  Service: Urology;  Laterality: Bilateral;    EAR MASTOIDECTOMY W/ COCHLEAR IMPLANT W/ LANDMARK      HERNIA REPAIR      left inguinal    KNEE SURGERY      left x3    pellets inserted into prostate    12/10/2018    for prostate cancer    PROSTATE BIOPSY      TONSILLECTOMY      TRANSRECTAL BIOPSY OF PROSTATE WITH ULTRASOUND GUIDANCE N/A 08/21/2018    Procedure: BIOPSY, PROSTATE, TRANSRECTAL APPROACH, WITH US GUIDANCE;  Surgeon: Adelso Cash MD;  Location: [facility];  Service: Urology;  Laterality: N/A;  Dr. Cash wants to do this case in between [facility] cases to start around 12 noon. [place] is aware.     Family History   Problem Relation Name Age of Onset    Heart disease Father          CAD    Diabetes Father      Kidney disease Father      Stroke Father      Heart disease Mother      Alzheimer's disease Mother      Diabetes Brother      Diabetes Brother       Social History     Substance and Sexual Activity   Alcohol Use No    Alcohol/week: 0.0 standard drinks of alcohol     Social History     Tobacco Use   Smoking Status Never   Smokeless Tobacco Never     Social History     Substance and Sexual Activity   Drug Use No       Current Outpatient Medications:     allopurinoL (ZYLOPRIM) 100 MG tablet, Take 1 tablet (100 mg total) by mouth once daily., Disp: 90 tablet, Rfl: 0    allopurinoL (ZYLOPRIM) 300 MG tablet, Take 1 tablet (300 mg total) by mouth once daily., Disp: 90 tablet, Rfl: 0    colchicine (COLCRYS) 0.6 mg tablet, Take 1 tablet (0.6 mg total) by mouth once daily., Disp: 30 tablet, Rfl: 0    ezetimibe (ZETIA) 10 mg tablet, Take 1 tablet (10 mg total) by mouth once daily., Disp: 90 tablet, Rfl: 0    irbesartan (AVAPRO) 75 MG tablet, Take 1 tablet (75 mg total) by mouth once daily., Disp: 90 tablet, Rfl: 0    pantoprazole (PROTONIX) 40 MG tablet, TAKE 1 TABLET BY MOUTH ONCE DAILY FOR STOMACH, Disp: 90 tablet, Rfl: 0    sertraline (ZOLOFT) 100 MG tablet, Take 1 tablet (100 mg total) by mouth once daily., Disp: 90 tablet, Rfl: 0    tadalafiL (CIALIS) 5 MG tablet, Take 5 mg by mouth daily as needed for Erectile Dysfunction., Disp: , Rfl:     Current Facility-Administered Medications:      acetaminophen tablet 650 mg, 650 mg, Oral, Once PRN, Tamela Mckinneyissa A., NP    albuterol inhaler 2 puff, 2 puff, Inhalation, Q20 Min PRN, Renae Mckinney A., NP    diphenhydrAMINE capsule 25 mg, 25 mg, Oral, Once PRN, Wongle Renae A., NP    EPINEPHrine (EPIPEN) 0.3 mg/0.3 mL pen injection 0.3 mg, 0.3 mg, Intramuscular, PRN, Faye Renae A., NP    ondansetron disintegrating tablet 4 mg, 4 mg, Oral, Once PRN, Tamela Mckinneyissa A., NP    Facility-Administered Medications Ordered in Other Visits:     0.9%  NaCl infusion, , Intravenous, Continuous, Nereyda Schumacher MD, New Bag at 05/03/24 0938     Objective:         Vitals:    01/17/25 1402   BP: 121/70   Pulse: 85   Resp: 18   Temp: 97.7 °F (36.5 °C)     Physical Exam   Constitutional: normal appearance.   HENT:   Head: Normocephalic.   Nose: Nose normal.   Mouth/Throat: Mucous membranes are moist.   Eyes: Pupils are equal, round, and reactive to light.   Cardiovascular: Normal pulses.   Pulmonary/Chest: Effort normal.   Musculoskeletal:         General: Tenderness present. Normal range of motion.      Cervical back: Normal range of motion.      Comments: Heberden and Kamila nodes   Bilateral knee tenderness    Neurological: He is alert.       Reviewed old and all outside pertinent medical records available.    All lab results personally reviewed and interpreted by me.  Lab Results   Component Value Date    WBC 7.9 08/21/2024    HGB 15.5 08/21/2024    HCT 47.5 08/21/2024    MCV 94.8 08/21/2024    MCH 30.9 08/21/2024    MCHC 32.6 08/21/2024    RDW 13.3 08/21/2024     08/21/2024    MPV 10.2 08/21/2024    NEUTROABS 5,514 08/21/2024    PLTEST Adequate 07/29/2011       Lab Results   Component Value Date     01/17/2025    K 4.2 01/17/2025     01/17/2025    CO2 21 (L) 01/17/2025     (H) 01/17/2025    BUN 19 01/17/2025    CALCIUM 9.5 01/17/2025    PROT 7.7 01/17/2025    ALBUMIN 4.3 01/17/2025    BILITOT 0.4 01/17/2025    AST 21 01/17/2025     "ALKPHOS 65 01/17/2025    ALT 39 01/17/2025       Lab Results   Component Value Date    COLORU Yellow 05/09/2020    APPEARANCEUA Clear 05/09/2020    SPECGRAV 1.025 05/09/2020    PHUR 6.0 05/09/2020    PROTEINUA Negative 05/09/2020    KETONESU Negative 05/09/2020    LEUKOCYTESUR Negative 05/09/2020    NITRITE Negative 05/09/2020    UROBILINOGEN Negative 05/09/2020       Lab Results   Component Value Date    CRP 3.0 01/17/2025       Lab Results   Component Value Date    SEDRATE 17 01/17/2025       Lab Results   Component Value Date    ROSEANNA Negative 06/04/2008    RF <13.0 01/17/2025    SEDRATE 17 01/17/2025       No components found for: "25OHVITDTOT", "24AEQLLM1", "45XHVBYX8", "METHODNOTE"    Lab Results   Component Value Date    URICACID 5.7 01/17/2025       No components found for: "TSPOTTB"    Lab Results   Component Value Date    ROSEANNA Negative 06/04/2008        Imaging:  All imaging reviewed and independently interpreted by me.         ASSESSMENT / PLAN:     Geo Pretty is a 74 y.o. White male with:      1. Inflammatory arthropathy  Will evaluate for RA   CPPD and gout I think are the main reasons for changes in joints and flare  ups   Wrist flare up more CPPD than gout   Xray hand showing Chondrocalcinosis   - Ambulatory referral/consult to Rheumatology  - Rheumatoid Factor; Future  - Cyclic Citrullinated Peptide Antibody, IgG; Future  - Sedimentation rate; Future  - C-Reactive Protein; Future  - Uric Acid; Future  - Comprehensive Metabolic Panel; Future  - Phosphorus; Future  - PTH, Intact; Future  - Ferritin; Future  - X-Ray Knee 3 View Bilateral; Future  - X-Ray Shoulder 2 or more views Bilat; Future  - X-Ray Cervical Spine AP And Lateral; Future    2. Mass of right wrist  - Ambulatory referral/consult to Rheumatology  - Most likely 2.2 to CPPD   - Colchicine prn     3. Calcium pyrophosphate deposition disease (CPPD) (Primary)  - evidence in hand xray bilateral   - Rheumatoid Factor; Future  - Cyclic " Citrullinated Peptide Antibody, IgG; Future  - Sedimentation rate; Future  - C-Reactive Protein; Future  - Uric Acid; Future  - Comprehensive Metabolic Panel; Future  - Phosphorus; Future  - PTH, Intact; Future  - Ferritin; Future  - X-Ray Knee 3 View Bilateral; Future  - X-Ray Shoulder 2 or more views Bilat; Future  - X-Ray Cervical Spine AP And Lateral; Future    4. Cervical radiculopathy  - Rheumatoid Factor; Future  - Cyclic Citrullinated Peptide Antibody, IgG; Future  - Sedimentation rate; Future  - C-Reactive Protein; Future  - Uric Acid; Future  - Comprehensive Metabolic Panel; Future  - Phosphorus; Future  - PTH, Intact; Future  - Ferritin; Future  - X-Ray Knee 3 View Bilateral; Future  - X-Ray Shoulder 2 or more views Bilat; Future  - X-Ray Cervical Spine AP And Lateral; Future    5. Abnormal finding of blood chemistry, unspecified  - Ferritin; Future    6. Gout, arthritis  The nature of gout is fully explained, including dietary relationship, acute and interval phase and treatment of both. Long term complications such as kidney stones, tophi and arthritis are discussed. Avoidance of alcohol recommended, and written literature is given along with a low purine diet. Indications for the use of allopurinol for prophylaxis and the use of colchicine to prevent or treat flare-ups is also discussed. Proper use of indomethacin for acute attacks discussed, and its side effects. Call if further attacks occur, or this one does not resolve promptly.  - continue current regimen   - Uric acid level     7. Counseling and coordination of care  - over 10 minutes spent regarding below topics:  - Immunization counseling done.  - Weight loss counseling done.  - Nutrition and exercise counseling.  - Limitation of alcohol consumption.  - Regular exercise:  Aerobic and resistance.  - Medication counseling provided.    Follow up in about 3 months (around 4/17/2025).    Method of contact with patient concerns: Bautista gale  Rheumatology    Disclaimer:  This note is prepared using voice recognition software and as such is likely to have errors and has not been proof read. Please contact me for questions.     Time spent: 60 minutes in face to face discussion concerning diagnosis, prognosis, review of lab and test results, benefits of treatment as well as management of disease, counseling of patient and coordination of care between various health care providers.  Greater than half the time spent was used for coordination of care and counseling of patient.    Leonard Herr M.D.  Rheumatology Department   [facility]

## 2025-07-30 ENCOUNTER — OFFICE VISIT (OUTPATIENT)
Dept: RHEUMATOLOGY | Facility: CLINIC | Age: 75
End: 2025-07-30
Payer: MEDICARE

## 2025-07-30 VITALS
HEART RATE: 74 BPM | HEIGHT: 70 IN | OXYGEN SATURATION: 98 % | SYSTOLIC BLOOD PRESSURE: 150 MMHG | DIASTOLIC BLOOD PRESSURE: 74 MMHG | WEIGHT: 183.44 LBS | BODY MASS INDEX: 26.26 KG/M2

## 2025-07-30 DIAGNOSIS — M19.90 INFLAMMATORY ARTHROPATHY: Primary | ICD-10-CM

## 2025-07-30 DIAGNOSIS — M54.12 CERVICAL RADICULOPATHY: ICD-10-CM

## 2025-07-30 DIAGNOSIS — M11.20 CALCIUM PYROPHOSPHATE DEPOSITION DISEASE (CPPD): ICD-10-CM

## 2025-07-30 DIAGNOSIS — M10.9 GOUT, ARTHRITIS: ICD-10-CM

## 2025-07-30 DIAGNOSIS — R79.9 ABNORMAL FINDING OF BLOOD CHEMISTRY, UNSPECIFIED: ICD-10-CM

## 2025-07-30 DIAGNOSIS — Z71.89 COUNSELING AND COORDINATION OF CARE: ICD-10-CM

## 2025-07-30 PROCEDURE — 3078F DIAST BP <80 MM HG: CPT | Mod: CPTII,S$GLB,, | Performed by: STUDENT IN AN ORGANIZED HEALTH CARE EDUCATION/TRAINING PROGRAM

## 2025-07-30 PROCEDURE — 99999 PR PBB SHADOW E&M-EST. PATIENT-LVL V: CPT | Mod: PBBFAC,,, | Performed by: STUDENT IN AN ORGANIZED HEALTH CARE EDUCATION/TRAINING PROGRAM

## 2025-07-30 PROCEDURE — 1101F PT FALLS ASSESS-DOCD LE1/YR: CPT | Mod: CPTII,S$GLB,, | Performed by: STUDENT IN AN ORGANIZED HEALTH CARE EDUCATION/TRAINING PROGRAM

## 2025-07-30 PROCEDURE — 1159F MED LIST DOCD IN RCRD: CPT | Mod: CPTII,S$GLB,, | Performed by: STUDENT IN AN ORGANIZED HEALTH CARE EDUCATION/TRAINING PROGRAM

## 2025-07-30 PROCEDURE — 1160F RVW MEDS BY RX/DR IN RCRD: CPT | Mod: CPTII,S$GLB,, | Performed by: STUDENT IN AN ORGANIZED HEALTH CARE EDUCATION/TRAINING PROGRAM

## 2025-07-30 PROCEDURE — 99215 OFFICE O/P EST HI 40 MIN: CPT | Mod: S$GLB,,, | Performed by: STUDENT IN AN ORGANIZED HEALTH CARE EDUCATION/TRAINING PROGRAM

## 2025-07-30 PROCEDURE — 3044F HG A1C LEVEL LT 7.0%: CPT | Mod: CPTII,S$GLB,, | Performed by: STUDENT IN AN ORGANIZED HEALTH CARE EDUCATION/TRAINING PROGRAM

## 2025-07-30 PROCEDURE — 4010F ACE/ARB THERAPY RXD/TAKEN: CPT | Mod: CPTII,S$GLB,, | Performed by: STUDENT IN AN ORGANIZED HEALTH CARE EDUCATION/TRAINING PROGRAM

## 2025-07-30 PROCEDURE — 3288F FALL RISK ASSESSMENT DOCD: CPT | Mod: CPTII,S$GLB,, | Performed by: STUDENT IN AN ORGANIZED HEALTH CARE EDUCATION/TRAINING PROGRAM

## 2025-07-30 PROCEDURE — 3008F BODY MASS INDEX DOCD: CPT | Mod: CPTII,S$GLB,, | Performed by: STUDENT IN AN ORGANIZED HEALTH CARE EDUCATION/TRAINING PROGRAM

## 2025-07-30 PROCEDURE — 3077F SYST BP >= 140 MM HG: CPT | Mod: CPTII,S$GLB,, | Performed by: STUDENT IN AN ORGANIZED HEALTH CARE EDUCATION/TRAINING PROGRAM

## 2025-07-30 PROCEDURE — 1125F AMNT PAIN NOTED PAIN PRSNT: CPT | Mod: CPTII,S$GLB,, | Performed by: STUDENT IN AN ORGANIZED HEALTH CARE EDUCATION/TRAINING PROGRAM

## 2025-07-30 PROCEDURE — G2211 COMPLEX E/M VISIT ADD ON: HCPCS | Mod: S$GLB,,, | Performed by: STUDENT IN AN ORGANIZED HEALTH CARE EDUCATION/TRAINING PROGRAM

## 2025-07-30 RX ORDER — HYDROXYCHLOROQUINE SULFATE 200 MG/1
400 TABLET, FILM COATED ORAL DAILY
Qty: 60 TABLET | Refills: 11 | Status: SHIPPED | OUTPATIENT
Start: 2025-07-30

## 2025-07-30 NOTE — PATIENT INSTRUCTIONS
Calcification in carotid artery shown in xray -- do you need a carotid ultrasound or was it done ?

## 2025-07-31 ENCOUNTER — OFFICE VISIT (OUTPATIENT)
Dept: CARDIOLOGY | Facility: CLINIC | Age: 75
End: 2025-07-31
Payer: MEDICARE

## 2025-07-31 VITALS
WEIGHT: 184 LBS | OXYGEN SATURATION: 97 % | DIASTOLIC BLOOD PRESSURE: 78 MMHG | BODY MASS INDEX: 26.4 KG/M2 | SYSTOLIC BLOOD PRESSURE: 140 MMHG | HEART RATE: 70 BPM

## 2025-07-31 DIAGNOSIS — R07.89 OTHER CHEST PAIN: Primary | ICD-10-CM

## 2025-07-31 DIAGNOSIS — M19.012 PRIMARY OSTEOARTHRITIS OF LEFT SHOULDER: ICD-10-CM

## 2025-07-31 DIAGNOSIS — K21.9 GASTROESOPHAGEAL REFLUX DISEASE WITHOUT ESOPHAGITIS: ICD-10-CM

## 2025-07-31 DIAGNOSIS — I10 PRIMARY HYPERTENSION: ICD-10-CM

## 2025-07-31 DIAGNOSIS — G47.30 SLEEP APNEA, UNSPECIFIED TYPE: ICD-10-CM

## 2025-07-31 DIAGNOSIS — E78.2 MIXED HYPERLIPIDEMIA: ICD-10-CM

## 2025-07-31 PROCEDURE — 3044F HG A1C LEVEL LT 7.0%: CPT | Mod: CPTII,S$GLB,, | Performed by: INTERNAL MEDICINE

## 2025-07-31 PROCEDURE — 3077F SYST BP >= 140 MM HG: CPT | Mod: CPTII,S$GLB,, | Performed by: INTERNAL MEDICINE

## 2025-07-31 PROCEDURE — 99999 PR PBB SHADOW E&M-EST. PATIENT-LVL III: CPT | Mod: PBBFAC,,, | Performed by: INTERNAL MEDICINE

## 2025-07-31 PROCEDURE — 1160F RVW MEDS BY RX/DR IN RCRD: CPT | Mod: CPTII,S$GLB,, | Performed by: INTERNAL MEDICINE

## 2025-07-31 PROCEDURE — 99215 OFFICE O/P EST HI 40 MIN: CPT | Mod: S$GLB,,, | Performed by: INTERNAL MEDICINE

## 2025-07-31 PROCEDURE — 1159F MED LIST DOCD IN RCRD: CPT | Mod: CPTII,S$GLB,, | Performed by: INTERNAL MEDICINE

## 2025-07-31 PROCEDURE — 3078F DIAST BP <80 MM HG: CPT | Mod: CPTII,S$GLB,, | Performed by: INTERNAL MEDICINE

## 2025-07-31 PROCEDURE — 1101F PT FALLS ASSESS-DOCD LE1/YR: CPT | Mod: CPTII,S$GLB,, | Performed by: INTERNAL MEDICINE

## 2025-07-31 PROCEDURE — 4010F ACE/ARB THERAPY RXD/TAKEN: CPT | Mod: CPTII,S$GLB,, | Performed by: INTERNAL MEDICINE

## 2025-07-31 PROCEDURE — 3008F BODY MASS INDEX DOCD: CPT | Mod: CPTII,S$GLB,, | Performed by: INTERNAL MEDICINE

## 2025-07-31 PROCEDURE — 1126F AMNT PAIN NOTED NONE PRSNT: CPT | Mod: CPTII,S$GLB,, | Performed by: INTERNAL MEDICINE

## 2025-07-31 PROCEDURE — 3288F FALL RISK ASSESSMENT DOCD: CPT | Mod: CPTII,S$GLB,, | Performed by: INTERNAL MEDICINE

## 2025-07-31 NOTE — PROGRESS NOTES
Subjective:    Patient ID:  Geo Pretty is a 74 y.o. male patient here for evaluation Follow-up      History of Present Illness:   Patient is a 74-year-old gentleman with history of GERD previous history of dyslipidemia arterial hypertension had developed sudden onset of atypical chest pains upper back pain lasting about an hour duration while he was driving to work periodic July 11th he presented to [facility] he was admitted for evaluation in the emergency room cardiac highly sensitive troponin I has been negative x2 and subsequently he has been discharged with outpatient follow-up recommendations  Patient has generalized body aches and muscle aches and multiple joints has been going on for some time apparently  No fever no chills no nausea vomiting no blood in the stools black stools  He denies having any palpitations he does have some pain feels like his radiating anteriorly at times       Risk  Decision regarding hospitalization.     74 y.o. male here for chest pain. Pulmonary exam - clear. Volume status - euvolemic. Pulses intact throughout.      DDx includes ACS, GERD, MSK etiology, among others. Clinically low suspicion for PE and acute aortic syndrome in this clinical context   Plan - labs, chest xray, EKG, symptom management.               ED Course as of 07/11/25 1501   Fri Jul 11, 2025   1333 EKG independently interpreted by me shows sinus rhythm rate 79 with left axis deviation, right bundle-branch block no acute changes from December 2024 [MI]   1457 High Sensitivity Troponin I: 3 [MI]   1459 Trop negative x 2 now. He is well appearing on reassessment. Vitals normal. The patient is stable for discharge home.  We discussed results, expected clinical course, appropriate follow-up and treatment plan.  We also discussed when they should return to the ER.   [MI]       Review of patient's allergies indicates:   Allergen Reactions    Ibuprofen Swelling     Other reaction(s): Swelling     Penicillins Anaphylaxis     Other reaction(s): Anaphylaxis    Penicillin        Past Medical History:   Diagnosis Date    Depression     Elevated PSA     Fatty liver     GERD (gastroesophageal reflux disease)     Gout     Hearing impaired     Hyperlipidemia     Hypertension     OA (osteoarthritis) of knee     MADELIN (obstructive sleep apnea)     uses cpap at night    Prostate cancer 08/2018     Past Surgical History:   Procedure Laterality Date    COLONOSCOPY  10/31/2018    COLONOSCOPY N/A 10/31/2018    Procedure: COLONOSCOPY;  Surgeon: Sly Sampson MD;  Location: [facility] ENDO;  Service: Endoscopy;  Laterality: N/A;    COLONOSCOPY  05/03/2024    COLONOSCOPY N/A 05/03/2024    Procedure: COLONOSCOPY;  Surgeon: Nereyda Schumacher MD;  Location: [facility] ENDO;  Service: Gastroenterology;  Laterality: N/A;    CYSTOSCOPY W/ RETROGRADES Bilateral 08/21/2018    Procedure: CYSTOSCOPY, WITH RETROGRADE PYELOGRAM;  Surgeon: Adelso Cash MD;  Location: [facility];  Service: Urology;  Laterality: Bilateral;    EAR MASTOIDECTOMY W/ COCHLEAR IMPLANT W/ LANDMARK      HERNIA REPAIR      left inguinal    KNEE SURGERY      left x3    pellets inserted into prostate   12/10/2018    for prostate cancer    PROSTATE BIOPSY      TONSILLECTOMY      TRANSRECTAL BIOPSY OF PROSTATE WITH ULTRASOUND GUIDANCE N/A 08/21/2018    Procedure: BIOPSY, PROSTATE, TRANSRECTAL APPROACH, WITH US GUIDANCE;  Surgeon: Adelso Cash MD;  Location: [facility] OR;  Service: Urology;  Laterality: N/A;  Dr. Cash wants to do this case in between [facility] cases to start around 12 noon. [place] is aware.     Social History[1]     Review of Systems:    As noted in HPI in addition      REVIEW OF SYSTEMS  CARDIOVASCULAR: No recent chest pain, palpitations, arm, neck, or jaw pain  RESPIRATORY: No recent fever, cough chills, SOB or congestion  : No blood in the urine  GI: No Nausea, vomiting, constipation, diarrhea, blood, or reflux.  MUSCULOSKELETAL: No myalgias  NEURO:  No lightheadedness or dizziness  EYES: No Double vision, blurry, vision or headache              Objective        Vitals:    07/31/25 1515   BP: (!) 140/78   Pulse: 70       LIPIDS - LAST 2   Lab Results   Component Value Date    CHOL 193 07/14/2025    CHOL 188 03/24/2025    HDL 35 (L) 07/14/2025    HDL 32 (L) 03/24/2025    LDLCALC 115.4 07/14/2025    LDLCALC 117.6 03/24/2025    TRIG 213 (H) 07/14/2025    TRIG 192 (H) 03/24/2025    CHOLHDL 18.1 (L) 07/14/2025    CHOLHDL 17.0 (L) 03/24/2025       CBC - LAST 2  Lab Results   Component Value Date    WBC 6.94 07/14/2025    WBC 5.08 03/24/2025    RBC 4.99 07/14/2025    RBC 4.74 03/24/2025    HGB 15.4 07/14/2025    HGB 14.9 03/24/2025    HCT 44.8 07/14/2025    HCT 43.4 03/24/2025    MCV 90 07/14/2025    MCV 92 03/24/2025    MCH 30.9 07/14/2025    MCH 31.4 03/24/2025    MCHC 34.4 07/14/2025    MCHC 34.3 03/24/2025    RDW 13.4 07/14/2025    RDW 13.6 03/24/2025     07/14/2025     03/24/2025    MPV 10.6 07/14/2025    MPV 10.1 03/24/2025    GRAN 6.2 05/09/2020    GRAN 76.7 (H) 05/09/2020    LYMPH 18.3 07/14/2025    LYMPH 1.27 07/14/2025    MONO 10.4 07/14/2025    MONO 0.72 07/14/2025    BASO 32 08/21/2024    BASO 33 05/17/2024    NRBC 0 07/14/2025    NRBC 0 03/24/2025       CHEMISTRY & LIVER FUNCTION - LAST 2  Lab Results   Component Value Date     07/14/2025     07/11/2025    K 3.8 07/14/2025    K 4.0 07/11/2025     07/14/2025     03/24/2025    CO2 23 07/14/2025    CO2 27 07/11/2025    ANIONGAP 11 07/14/2025    ANIONGAP 5 (L) 07/11/2025    BUN 14 07/14/2025    BUN 10.5 07/11/2025    CREATININE 0.8 07/14/2025    CREATININE 0.88 07/11/2025    GLU 98 07/14/2025     03/24/2025    CALCIUM 9.3 07/14/2025    CALCIUM 9.3 07/11/2025    ALBUMIN 4.6 07/14/2025    ALBUMIN 4.6 07/11/2025    PROT 7.8 07/14/2025    PROT 7.3 03/24/2025    ALKPHOS 65 07/14/2025    ALKPHOS 58 03/24/2025    ALT 32 07/14/2025    ALT 23 07/11/2025    AST 33 07/14/2025     AST 18 07/11/2025    BILITOT 0.7 07/14/2025    BILITOT 0.5 07/11/2025        CARDIAC PROFILE - LAST 2  Lab Results   Component Value Date    TROPONINI <0.01 12/26/2017        COAGULATION - LAST 2  Lab Results   Component Value Date    INR 1.0 08/17/2018    APTT 26.9 08/17/2018       ENDOCRINE & PSA - LAST 2  Lab Results   Component Value Date    HGBA1C 5.5 07/14/2025    HGBA1C 5.3 03/24/2025    TSH 1.375 07/14/2025    TSH 1.257 03/24/2025    PSA 0.01 02/20/2025    PSA 2.7 12/15/2014        ECHOCARDIOGRAM RESULTS  Results for orders placed during the hospital encounter of 06/27/24    Echo    Interpretation Summary    Left Ventricle: The left ventricle is dilated. Mildly to moderately increased wall thickness. Difficult to exclude basal inferior hypokinesis. There is normal systolic function with a visually estimated ejection fraction of 55 - 60%. There is normal diastolic function.    Aortic Valve: The aortic valve is a trileaflet valve. Mildly calcified cusps. Mildly restricted motion. There is mild aortic regurgitation.    Mitral Valve: There is no stenosis.  There is trace to mild regurgitation.    Right Ventricle: Normal right ventricular cavity size. Systolic function is normal.    Pulmonic Valve: There is no stenosis.    IVC/SVC: Intermediate venous pressure at 8 mmHg.      CURRENT/PREVIOUS VISIT EKG  Results for orders placed or performed during the hospital encounter of 06/27/24   EKG 12-lead    Collection Time: 06/27/24  7:09 AM   Result Value Ref Range    QRS Duration 154 ms    [facility] QTC Calculation 446 ms    Narrative    Test Reason : R42,    Vent. Rate : 068 BPM     Atrial Rate : 068 BPM     P-R Int : 176 ms          QRS Dur : 154 ms      QT Int : 420 ms       P-R-T Axes : 058 -18 038 degrees     QTc Int : 446 ms    Normal sinus rhythm  Right bundle branch block  Abnormal ECG  When compared with ECG of 26-DEC-2017 15:43,  Right bundle branch block has replaced Incomplete right bundle branch  block  Confirmed by Saima PRETTY, Miguel MARTIN ([id number]) on 6/27/2024 11:49:37 AM    Referred By: HARVEY OSPINA           Confirmed By:Miguel Landaverde MD     No valid procedures specified.   Results for orders placed during the hospital encounter of 09/25/24    Nuclear Stress - Cardiology Interpreted    Interpretation Summary    Normal myocardial perfusion scan. There is no evidence of myocardial ischemia or infarction.    The gated perfusion images showed an ejection fraction of 63% at rest. The gated perfusion images showed an ejection fraction of 71% post stress. Normal ejection fraction is greater than 53%.    There is normal wall motion at rest and post-stress.    LV cavity size is normal at rest and normal at post-stress.    The ECG portion of the study is negative for ischemia.    The patient reported no chest pain during the stress test.    There were no arrhythmias during stress.    The patient exercised for 3 minutes 23 seconds on a Paul protocol, corresponding to a functional capacity of 5METS, achieving a peak heart rate of 129 bpm, which is 87% of the age predicted maximum heart rate.    No valid procedures specified.        MOST RECENT CARDIAC PET  No results found for this or any previous visit.      MOST RECENT NM MYOCARDIAL PERFUSION  Results for orders placed during the hospital encounter of 09/11/18    NM Bone Scan Whole Body    Narrative  EXAMINATION:  NM BONE SCAN WHOLE BODY    CLINICAL HISTORY:  Prostate cancer;  Malignant neoplasm of prostate    TECHNIQUE:  Following the IV administration of 28.9 mCi of Tc-99m-MDP, anterior and posterior delayed whole body images were acquired with additional views of the pelvis .    COMPARISON:  None.    FINDINGS:  There is physiologic distribution of the radiopharmaceutical throughout the skeleton. Both kidneys and the bladder appear normal.  There is mild uptake identified at the right knee both shoulders consistent with degenerative changes.  Mild uptake at 2  foci of the right spine, T12 and L5 are consistent with degenerative changes and spurs noted on the prior CT scan of the abdomen and pelvis.    IMPRESSION:    There is no scintigraphic evidence of metastatic disease.  Degenerative changes in the spine and at the right knee.      Electronically signed by: Sly Jo MD  Date:    09/11/2018  Time:    14:14      MOST RECENT ANGIOGRAM  No results found for this or any previous visit.      MOST RECENT CTA OF CHEST  No results found for this or any previous visit.      CAROTID ULTRASOUND RESULTS  Results for orders placed during the hospital encounter of 07/16/24    Radiology US Carotid Bilateral    Narrative  EXAMINATION:  US CAROTID BILATERAL    CLINICAL HISTORY:  Dizziness and giddiness    FINDINGS:  Right peak systolic 62.1 cm/second, systolic ratio 0.6, diastolic ratio 0.9.    Left peak systolic 60.4 cm/second, systolic ratio 0.6, diastolic ratio 1.1.    There is normal flow in the vertebral arteries.    No plaque, thrombus, stenosis or dissection seen.    Impression  No significant stenosis seen.      Electronically signed by: Jimbo Rankin MD  Date:    07/16/2024  Time:    10:51    No results found for this or any previous visit.    No results found for this or any previous visit.      PHYSICAL EXAM  CONSTITUTIONAL: Well built, well nourished in no apparent distress  NECK: no carotid bruit, no JVD  LUNGS: CTA  CHEST WALL: no tenderness  HEART: regular rate and rhythm, S1, S2 normal, no murmur, click, rub or gallop   ABDOMEN: soft, non-tender; bowel sounds normal; no masses,  no organomegaly  EXTREMITIES: Extremities normal, no edema, no calf tenderness noted  NEURO: AAO X 3    I HAVE REVIEWED :    The vital signs, nurses notes, and all the pertinent radiology and labs.  I have reviewed all the pertinent labs EKG findings from [facility] as well.    07/31/2025 EKG shows: Sinus rhythm rate of 68 beats per minute right bundle branch block morphology with no  acute ST changes noted    Current Outpatient Medications   Medication Instructions    allopurinoL (ZYLOPRIM) 100 mg, Oral, Daily    allopurinoL (ZYLOPRIM) 300 mg, Oral, Daily    colchicine (COLCRYS) 0.6 mg, Oral    ergocalciferol (VITAMIN D2) 50,000 Units, Oral, Every 7 days    ezetimibe (ZETIA) 10 mg, Oral, Daily    hydroxychloroquine (PLAQUENIL) 400 mg, Oral, Daily    irbesartan (AVAPRO) 75 mg, Oral, Daily    pantoprazole (PROTONIX) 40 MG tablet TAKE 1 TABLET BY MOUTH ONCE DAILY FOR STOMACH    primidone (MYSOLINE) 50 MG Tab Take by mouth.    sertraline (ZOLOFT) 100 mg, Oral, Daily    tadalafiL (CIALIS) 5 mg, Daily PRN          Assessment & Plan     1. Other chest pain  Assessment & Plan:  Recurrent episodes of atypical chest pains noted.  Cardiac troponins has been negative on recent evaluation  Recommend to repeat his symptom limited exercise stress test with an imaging study to evaluate for any progression of ischemic heart disease and if this has abnormal then consider angiographic assessment more definite diagnosis.      2. Mixed hyperlipidemia  Assessment & Plan:  He is off simvastatin at this time he is continuing in his Zetia 10 mg daily      3. Primary hypertension  Assessment & Plan:  Blood pressure is 140/70 consider increasing the Avapro to 150 mg daily.  Maintain on low-salt low-fat diet      4. Gastroesophageal reflux disease without esophagitis  Assessment & Plan:  Continue on pantoprazole 40 mg a day will add magnesium oxide to his regimen      5. Sleep apnea, unspecified type  Assessment & Plan:  He has been compliant with the CPAP machine.      6. Primary osteoarthritis of left shoulder  Assessment & Plan:  He has seen a rheumatologist in follow-up and apparently he is starting on hydroxychloroquine            No follow-ups on file.            [1]   Social History  Tobacco Use    Smoking status: Never    Smokeless tobacco: Never   Substance Use Topics    Alcohol use: No     Alcohol/week: 0.0  standard drinks of alcohol    Drug use: No

## 2025-07-31 NOTE — ASSESSMENT & PLAN NOTE
Recurrent episodes of atypical chest pains noted.  Cardiac troponins has been negative on recent evaluation  Recommend to repeat his symptom limited exercise stress test with an imaging study to evaluate for any progression of ischemic heart disease and if this has abnormal then consider angiographic assessment more definite diagnosis.

## 2025-07-31 NOTE — ASSESSMENT & PLAN NOTE
Blood pressure is 140/70 consider increasing the Avapro to 150 mg daily.  Maintain on low-salt low-fat diet

## 2025-08-12 ENCOUNTER — TELEPHONE (OUTPATIENT)
Dept: CARDIOLOGY | Facility: HOSPITAL | Age: 75
End: 2025-08-12

## 2025-08-13 ENCOUNTER — HOSPITAL ENCOUNTER (OUTPATIENT)
Dept: CARDIOLOGY | Facility: HOSPITAL | Age: 75
Discharge: HOME | End: 2025-08-13
Attending: INTERNAL MEDICINE
Payer: MEDICARE

## 2025-08-13 ENCOUNTER — HOSPITAL ENCOUNTER (OUTPATIENT)
Dept: RADIOLOGY | Facility: HOSPITAL | Age: 75
Discharge: HOME | End: 2025-08-13
Attending: INTERNAL MEDICINE
Payer: MEDICARE

## 2025-08-13 VITALS — WEIGHT: 184 LBS | HEIGHT: 70 IN | BODY MASS INDEX: 26.34 KG/M2

## 2025-08-13 DIAGNOSIS — R07.89 OTHER CHEST PAIN: ICD-10-CM

## 2025-08-13 DIAGNOSIS — M19.012 PRIMARY OSTEOARTHRITIS OF LEFT SHOULDER: ICD-10-CM

## 2025-08-13 PROCEDURE — 93306 TTE W/DOPPLER COMPLETE: CPT | Mod: 26,,, | Performed by: INTERNAL MEDICINE

## 2025-08-13 PROCEDURE — 93016 CV STRESS TEST SUPVJ ONLY: CPT | Mod: ,,, | Performed by: INTERNAL MEDICINE

## 2025-08-13 PROCEDURE — A9502 TC99M TETROFOSMIN: HCPCS | Performed by: INTERNAL MEDICINE

## 2025-08-13 PROCEDURE — 78452 HT MUSCLE IMAGE SPECT MULT: CPT

## 2025-08-13 PROCEDURE — 93306 TTE W/DOPPLER COMPLETE: CPT

## 2025-08-13 PROCEDURE — 93018 CV STRESS TEST I&R ONLY: CPT | Mod: ,,, | Performed by: INTERNAL MEDICINE

## 2025-08-13 PROCEDURE — 78452 HT MUSCLE IMAGE SPECT MULT: CPT | Mod: 26,,, | Performed by: INTERNAL MEDICINE

## 2025-08-13 RX ADMIN — TETROFOSMIN 12.7 MILLICURIE: 1.38 INJECTION, POWDER, LYOPHILIZED, FOR SOLUTION INTRAVENOUS at 07:08

## 2025-08-13 RX ADMIN — TETROFOSMIN 25.1 MILLICURIE: 1.38 INJECTION, POWDER, LYOPHILIZED, FOR SOLUTION INTRAVENOUS at 09:08

## 2025-08-14 LAB
AORTIC ROOT ANNULUS: 3.5 CM
AORTIC SIZE INDEX: 1.6 CM/M2
AORTIC VALVE CUSP SEPERATION: 1.98 CM
APICAL FOUR CHAMBER EJECTION FRACTION: 53 %
ASCENDING AORTA: 3.2 CM
AV INDEX (PROSTH): 1.21
AV MEAN GRADIENT: 2 MMHG
AV PEAK GRADIENT: 3 MMHG
AV VALVE AREA BY VELOCITY RATIO: 3.1 CM²
AV VALVE AREA: 3.8 CM²
AV VELOCITY RATIO: 1
BSA FOR ECHO PROCEDURE: 2.03 M2
CV ECHO LV RWT: 0.4 CM
CV STRESS BASE HR: 74 BPM
DIASTOLIC BLOOD PRESSURE: 68 MMHG
DOP CALC AO PEAK VEL: 0.9 M/S
DOP CALC AO VTI: 17 CM
DOP CALC LVOT AREA: 3.1 CM2
DOP CALC LVOT DIAMETER: 2 CM
DOP CALC LVOT PEAK VEL: 0.9 M/S
DOP CALC MV VTI: 14.4 CM
DOP CALCLVOT PEAK VEL VTI: 20.6 CM
E WAVE DECELERATION TIME: 196 MSEC
E/A RATIO: 0.88
E/E' RATIO: 8 M/S
ECHO LV POSTERIOR WALL: 1 CM (ref 0.6–1.1)
EJECTION FRACTION- HIGH: 65 %
END DIASTOLIC INDEX-HIGH: 153 ML/M2
END DIASTOLIC INDEX-LOW: 93 ML/M2
END SYSTOLIC INDEX-HIGH: 71 ML/M2
END SYSTOLIC INDEX-LOW: 31 ML/M2
FRACTIONAL SHORTENING: 32 % (ref 28–44)
INTERVENTRICULAR SEPTUM: 1 CM (ref 0.6–1.1)
IVC DIAMETER: 1.75 CM
LA MAJOR: 5 CM
LA MINOR: 4.6 CM
LEFT ATRIUM AREA SYSTOLIC (APICAL 2 CHAMBER): 17.51 CM2
LEFT ATRIUM AREA SYSTOLIC (APICAL 4 CHAMBER): 16.59 CM2
LEFT ATRIUM SIZE: 3.8 CM
LEFT ATRIUM VOLUME INDEX MOD: 23 ML/M2
LEFT ATRIUM VOLUME MOD: 46 ML
LEFT INTERNAL DIMENSION IN SYSTOLE: 3.4 CM (ref 2.1–4)
LEFT VENTRICLE DIASTOLIC VOLUME INDEX: 58.71 ML/M2
LEFT VENTRICLE DIASTOLIC VOLUME: 118 ML
LEFT VENTRICLE END DIASTOLIC VOLUME APICAL 4 CHAMBER INDEX BSA: 49 ML/M2
LEFT VENTRICLE END DIASTOLIC VOLUME APICAL 4 CHAMBER: 98.5 ML
LEFT VENTRICLE END SYSTOLIC VOLUME APICAL 2 CHAMBER: 50.93 ML
LEFT VENTRICLE END SYSTOLIC VOLUME APICAL 4 CHAMBER: 42.47 ML
LEFT VENTRICLE MASS INDEX: 90.5 G/M2
LEFT VENTRICLE SYSTOLIC VOLUME INDEX: 22.9 ML/M2
LEFT VENTRICLE SYSTOLIC VOLUME: 46 ML
LEFT VENTRICULAR INTERNAL DIMENSION IN DIASTOLE: 5 CM (ref 3.5–6)
LEFT VENTRICULAR MASS: 182 G
LV LATERAL E/E' RATIO: 8.6 M/S
LV SEPTAL E/E' RATIO: 7.5 M/S
LVED V (TEICH): 118.41 ML
LVES V (TEICH): 46.06 ML
LVOT MG: 1.91 MMHG
LVOT MV: 0.66 CM/S
Lab: 1.8 CM/M
MV MEAN GRADIENT: 1 MMHG
MV PEAK A VEL: 0.68 M/S
MV PEAK E VEL: 0.6 M/S
MV PEAK GRADIENT: 2 MMHG
MV STENOSIS PRESSURE HALF TIME: 62.76 MS
MV VALVE AREA BY CONTINUITY EQUATION: 4.49 CM2
MV VALVE AREA P 1/2 METHOD: 3.51 CM2
NUC REST DIASTOLIC VOLUME INDEX: 77
NUC REST EJECTION FRACTION: 53
NUC REST SYSTOLIC VOLUME INDEX: 36
NUC STRESS DIASTOLIC VOLUME INDEX: 76
NUC STRESS EJECTION FRACTION: 72 %
NUC STRESS SYSTOLIC VOLUME INDEX: 21
OHS CV CPX 1 MINUTE RECOVERY HEART RATE: 127 BPM
OHS CV CPX 85 PERCENT MAX PREDICTED HEART RATE MALE: 126
OHS CV CPX ESTIMATED METS: 7
OHS CV CPX MAX PREDICTED HEART RATE: 148
OHS CV CPX PATIENT HEIGHT IN: 70
OHS CV CPX PATIENT HEIGHT IN: 70
OHS CV CPX PATIENT IS FEMALE: 0
OHS CV CPX PATIENT IS MALE: 1
OHS CV CPX PEAK DIASTOLIC BLOOD PRESSURE: 86 MMHG
OHS CV CPX PEAK HEAR RATE: 137 BPM
OHS CV CPX PEAK RATE PRESSURE PRODUCT: NORMAL
OHS CV CPX PEAK SYSTOLIC BLOOD PRESSURE: 175 MMHG
OHS CV CPX PERCENT MAX PREDICTED HEART RATE ACHIEVED: 93
OHS CV CPX RATE PRESSURE PRODUCT PRESENTING: 8806
OHS CV INITIAL DOSE: 12.7 MCG/KG/MIN
OHS CV PEAK DOSE: 25.1 MCG/KG/MIN
PISA TR MAX VEL: 1.7 M/S
PV MV: 0.61 M/S
PV PEAK GRADIENT: 3 MMHG
PV PEAK VELOCITY: 0.82 M/S
RA PRESSURE ESTIMATED: 3 MMHG
RETIRED EF AND QEF - SEE NOTES: 53 %
RV TB RVSP: 5 MMHG
RV TISSUE DOPPLER FREE WALL SYSTOLIC VELOCITY 1 (APICAL 4 CHAMBER VIEW): 10.84 CM/S
STJ: 3.3 CM
STRESS ECHO POST EXERCISE DUR MIN: 5 MINUTES
SYSTOLIC BLOOD PRESSURE: 119 MMHG
TDI LATERAL: 0.07 M/S
TDI SEPTAL: 0.08 M/S
TDI: 0.08 M/S
TR MAX PG: 12 MMHG
TV REST PULMONARY ARTERY PRESSURE: 15 MMHG
Z-SCORE OF LEFT VENTRICULAR DIMENSION IN END DIASTOLE: -1.64
Z-SCORE OF LEFT VENTRICULAR DIMENSION IN END SYSTOLE: -0.48

## 2025-08-21 PROCEDURE — 84154 ASSAY OF PSA FREE: CPT | Performed by: SURGERY

## (undated) DEVICE — SOL WATER STRL IRR 1000ML

## (undated) DEVICE — GUIDE WIRE MOTION .035 X 150CM

## (undated) DEVICE — CONTAINER SPECIMEN STRL 4OZ

## (undated) DEVICE — SOL IRR WATER STRL 3000 ML

## (undated) DEVICE — GAUZE SPONGE BULKEE 6X6.75IN

## (undated) DEVICE — CATH CONE TIP

## (undated) DEVICE — SLEEVE SCD EXPRESS CALF MEDIUM

## (undated) DEVICE — SYR 50ML CATH TIP

## (undated) DEVICE — SEE MEDLINE ITEM 157116

## (undated) DEVICE — SEE MEDLINE ITEM 157185

## (undated) DEVICE — GUN BIOPSY 18GA MONOPLY

## (undated) DEVICE — SCRUB 10% POVIDONE IODINE 4OZ

## (undated) DEVICE — SYR ONLY LUER LOCK 20CC

## (undated) DEVICE — GLOVE SURG ULTRA TOUCH 7.5

## (undated) DEVICE — SEE MEDLINE ITEM 152487

## (undated) DEVICE — GUIDE BIOPSY BIPLANAR 18G

## (undated) DEVICE — LUBRICANT SURGILUBE 2 OZ

## (undated) DEVICE — SET IRR URLGY 2LINE UNIV SPIKE

## (undated) DEVICE — SOL 9P NACL IRR PIC IL